# Patient Record
Sex: FEMALE | Race: BLACK OR AFRICAN AMERICAN | NOT HISPANIC OR LATINO | Employment: FULL TIME | URBAN - METROPOLITAN AREA
[De-identification: names, ages, dates, MRNs, and addresses within clinical notes are randomized per-mention and may not be internally consistent; named-entity substitution may affect disease eponyms.]

---

## 2017-07-28 LAB — POC BETA-HCG (QUAL): NEGATIVE

## 2019-07-06 LAB
BILIRUB SERPL-MCNC: NEGATIVE MG/DL
BLOOD URINE, POC: NORMAL
CLARITY, POC UA: CLEAR
COLOR, POC UA: YELLOW
GLUCOSE UR QL STRIP: NEGATIVE
KETONES UR QL STRIP: NEGATIVE
LEUKOCYTE EST, POC UA: NEGATIVE
NITRITE, POC UA: NEGATIVE
PH, POC UA: 6
PROTEIN, POC: NORMAL
SPECIFIC GRAVITY, POC UA: 1.02
UROBILINOGEN, POC UA: NORMAL

## 2021-01-26 ENCOUNTER — HISTORICAL (OUTPATIENT)
Dept: ADMINISTRATIVE | Facility: HOSPITAL | Age: 41
End: 2021-01-26

## 2021-01-26 LAB
ABS NEUT (OLG): 2.73 X10(3)/MCL (ref 2.1–9.2)
ALBUMIN SERPL-MCNC: 3.9 GM/DL (ref 3.5–5)
ALBUMIN/GLOB SERPL: 1.1 RATIO (ref 1.1–2)
ALP SERPL-CCNC: 44 UNIT/L (ref 40–150)
ALT SERPL-CCNC: 11 UNIT/L (ref 0–55)
AST SERPL-CCNC: 18 UNIT/L (ref 5–34)
BASOPHILS # BLD AUTO: 0 X10(3)/MCL (ref 0–0.2)
BASOPHILS NFR BLD AUTO: 1 %
BILIRUB SERPL-MCNC: 0.3 MG/DL
BILIRUBIN DIRECT+TOT PNL SERPL-MCNC: 0.1 MG/DL (ref 0–0.5)
BILIRUBIN DIRECT+TOT PNL SERPL-MCNC: 0.2 MG/DL (ref 0–0.8)
BUN SERPL-MCNC: 9 MG/DL (ref 7–18.7)
CALCIUM SERPL-MCNC: 8.6 MG/DL (ref 8.4–10.2)
CHLORIDE SERPL-SCNC: 107 MMOL/L (ref 98–107)
CHOLEST SERPL-MCNC: 154 MG/DL
CHOLEST/HDLC SERPL: 4 {RATIO} (ref 0–5)
CO2 SERPL-SCNC: 25 MMOL/L (ref 22–29)
CREAT SERPL-MCNC: 0.8 MG/DL (ref 0.55–1.02)
EOSINOPHIL # BLD AUTO: 0.1 X10(3)/MCL (ref 0–0.9)
EOSINOPHIL NFR BLD AUTO: 2 %
ERYTHROCYTE [DISTWIDTH] IN BLOOD BY AUTOMATED COUNT: 18.4 % (ref 11.5–14.5)
EST. AVERAGE GLUCOSE BLD GHB EST-MCNC: 105.4 MG/DL
GLOBULIN SER-MCNC: 3.7 GM/DL (ref 2.4–3.5)
GLUCOSE SERPL-MCNC: 88 MG/DL (ref 74–100)
HAV IGM SERPL QL IA: NONREACTIVE
HBA1C MFR BLD: 5.3 %
HBV CORE IGM SERPL QL IA: NONREACTIVE
HBV SURFACE AG SERPL QL IA: NONREACTIVE
HCT VFR BLD AUTO: 32.7 % (ref 35–46)
HCV AB SERPL QL IA: NONREACTIVE
HDLC SERPL-MCNC: 44 MG/DL (ref 35–60)
HGB BLD-MCNC: 9.7 GM/DL (ref 12–16)
HIV 1+2 AB+HIV1 P24 AG SERPL QL IA: NONREACTIVE
IMM GRANULOCYTES # BLD AUTO: 0.01 10*3/UL
IMM GRANULOCYTES NFR BLD AUTO: 0 %
LDLC SERPL CALC-MCNC: 102 MG/DL (ref 50–140)
LYMPHOCYTES # BLD AUTO: 1.2 X10(3)/MCL (ref 0.6–4.6)
LYMPHOCYTES NFR BLD AUTO: 26 %
MCH RBC QN AUTO: 22.9 PG (ref 26–34)
MCHC RBC AUTO-ENTMCNC: 29.7 GM/DL (ref 31–37)
MCV RBC AUTO: 77.3 FL (ref 80–100)
MONOCYTES # BLD AUTO: 0.4 X10(3)/MCL (ref 0.1–1.3)
MONOCYTES NFR BLD AUTO: 9 %
NEUTROPHILS # BLD AUTO: 2.73 X10(3)/MCL (ref 2.1–9.2)
NEUTROPHILS NFR BLD AUTO: 62 %
PLATELET # BLD AUTO: 264 X10(3)/MCL (ref 130–400)
PMV BLD AUTO: 10.7 FL (ref 7.4–10.4)
POTASSIUM SERPL-SCNC: 4 MMOL/L (ref 3.5–5.1)
PROT SERPL-MCNC: 7.6 GM/DL (ref 6.4–8.3)
RBC # BLD AUTO: 4.23 X10(6)/MCL (ref 4–5.2)
SODIUM SERPL-SCNC: 139 MMOL/L (ref 136–145)
T PALLIDUM AB SER QL: NONREACTIVE
TRIGL SERPL-MCNC: 39 MG/DL (ref 37–140)
TSH SERPL-ACNC: 0.77 UIU/ML (ref 0.35–4.94)
VLDLC SERPL CALC-MCNC: 8 MG/DL
WBC # SPEC AUTO: 4.4 X10(3)/MCL (ref 4.5–11)

## 2021-03-09 ENCOUNTER — HISTORICAL (OUTPATIENT)
Dept: RADIOLOGY | Facility: HOSPITAL | Age: 41
End: 2021-03-09

## 2021-03-23 ENCOUNTER — HISTORICAL (OUTPATIENT)
Dept: RADIOLOGY | Facility: HOSPITAL | Age: 41
End: 2021-03-23

## 2021-04-13 ENCOUNTER — HISTORICAL (OUTPATIENT)
Dept: RADIOLOGY | Facility: HOSPITAL | Age: 41
End: 2021-04-13

## 2021-04-20 LAB
PAP RECOMMENDATION EXT: NORMAL
PAP SMEAR: NORMAL

## 2021-04-27 ENCOUNTER — HISTORICAL (OUTPATIENT)
Dept: CARDIOLOGY | Facility: CLINIC | Age: 41
End: 2021-04-27

## 2021-04-27 LAB
BUN SERPL-MCNC: 12.9 MG/DL (ref 7–18.7)
CALCIUM SERPL-MCNC: 8.5 MG/DL (ref 8.4–10.2)
CHLORIDE SERPL-SCNC: 108 MMOL/L (ref 98–107)
CO2 SERPL-SCNC: 24 MMOL/L (ref 22–29)
CREAT SERPL-MCNC: 0.77 MG/DL (ref 0.55–1.02)
CREAT/UREA NIT SERPL: 17
GLUCOSE SERPL-MCNC: 87 MG/DL (ref 74–100)
HAV IGM SERPL QL IA: NONREACTIVE
HBV CORE IGM SERPL QL IA: NONREACTIVE
HBV SURFACE AG SERPL QL IA: NONREACTIVE
HCV AB SERPL QL IA: NONREACTIVE
HIV 1+2 AB+HIV1 P24 AG SERPL QL IA: NONREACTIVE
POTASSIUM SERPL-SCNC: 4.3 MMOL/L (ref 3.5–5.1)
SODIUM SERPL-SCNC: 139 MMOL/L (ref 136–145)
T PALLIDUM AB SER QL: NONREACTIVE

## 2021-05-25 ENCOUNTER — HISTORICAL (OUTPATIENT)
Dept: CARDIOLOGY | Facility: HOSPITAL | Age: 41
End: 2021-05-25

## 2021-06-29 ENCOUNTER — HISTORICAL (OUTPATIENT)
Dept: INTERNAL MEDICINE | Facility: CLINIC | Age: 41
End: 2021-06-29

## 2021-06-29 LAB
ERYTHROCYTE [DISTWIDTH] IN BLOOD BY AUTOMATED COUNT: 15.8 % (ref 11.5–14.5)
FERRITIN SERPL-MCNC: 7.02 NG/ML (ref 4.63–204)
HCT VFR BLD AUTO: 34.8 % (ref 35–46)
HGB BLD-MCNC: 10.5 GM/DL (ref 12–16)
IRON SATN MFR SERPL: 11 % (ref 20–50)
IRON SERPL-MCNC: 41 UG/DL (ref 50–170)
MCH RBC QN AUTO: 23.9 PG (ref 26–34)
MCHC RBC AUTO-ENTMCNC: 30.2 GM/DL (ref 31–37)
MCV RBC AUTO: 79.1 FL (ref 80–100)
NRBC BLD AUTO-RTO: 0 % (ref 0–0.2)
PLATELET # BLD AUTO: 237 X10(3)/MCL (ref 130–400)
PMV BLD AUTO: 10.7 FL (ref 7.4–10.4)
RBC # BLD AUTO: 4.4 X10(6)/MCL (ref 4–5.2)
TIBC SERPL-MCNC: 318 UG/DL (ref 70–310)
TIBC SERPL-MCNC: 359 UG/DL (ref 250–450)
TRANSFERRIN SERPL-MCNC: 318 MG/DL (ref 180–382)
WBC # SPEC AUTO: 4.7 X10(3)/MCL (ref 4.5–11)

## 2021-07-20 ENCOUNTER — HISTORICAL (OUTPATIENT)
Dept: ADMINISTRATIVE | Facility: HOSPITAL | Age: 41
End: 2021-07-20

## 2021-07-20 LAB
ABS NEUT (OLG): 3.17 X10(3)/MCL (ref 2.1–9.2)
APTT PPP: 32.1 SECOND(S) (ref 23.3–37)
BASOPHILS # BLD AUTO: 0 X10(3)/MCL (ref 0–0.2)
BASOPHILS NFR BLD AUTO: 1 %
BUN SERPL-MCNC: 11.9 MG/DL (ref 7–18.7)
CALCIUM SERPL-MCNC: 9.1 MG/DL (ref 8.4–10.2)
CHLORIDE SERPL-SCNC: 106 MMOL/L (ref 98–107)
CO2 SERPL-SCNC: 27 MMOL/L (ref 22–29)
CREAT SERPL-MCNC: 0.79 MG/DL (ref 0.55–1.02)
CREAT/UREA NIT SERPL: 15
EOSINOPHIL # BLD AUTO: 0.1 X10(3)/MCL (ref 0–0.9)
EOSINOPHIL NFR BLD AUTO: 2 %
ERYTHROCYTE [DISTWIDTH] IN BLOOD BY AUTOMATED COUNT: 17.3 % (ref 11.5–14.5)
GLUCOSE SERPL-MCNC: 83 MG/DL (ref 74–100)
HCT VFR BLD AUTO: 39.3 % (ref 35–46)
HGB BLD-MCNC: 11.7 GM/DL (ref 12–16)
IMM GRANULOCYTES # BLD AUTO: 0.01 10*3/UL
IMM GRANULOCYTES NFR BLD AUTO: 0 %
INR PPP: 1.09 (ref 0.9–1.2)
LYMPHOCYTES # BLD AUTO: 1.1 X10(3)/MCL (ref 0.6–4.6)
LYMPHOCYTES NFR BLD AUTO: 23 %
MCH RBC QN AUTO: 24.7 PG (ref 26–34)
MCHC RBC AUTO-ENTMCNC: 29.8 GM/DL (ref 31–37)
MCV RBC AUTO: 82.9 FL (ref 80–100)
MONOCYTES # BLD AUTO: 0.4 X10(3)/MCL (ref 0.1–1.3)
MONOCYTES NFR BLD AUTO: 9 %
NEUTROPHILS # BLD AUTO: 3.17 X10(3)/MCL (ref 2.1–9.2)
NEUTROPHILS NFR BLD AUTO: 65 %
NRBC BLD AUTO-RTO: 0 % (ref 0–0.2)
PLATELET # BLD AUTO: 249 X10(3)/MCL (ref 130–400)
PMV BLD AUTO: 11.5 FL (ref 7.4–10.4)
POTASSIUM SERPL-SCNC: 4.1 MMOL/L (ref 3.5–5.1)
PROTHROMBIN TIME: 13.9 SECOND(S) (ref 11.9–14.4)
RBC # BLD AUTO: 4.74 X10(6)/MCL (ref 4–5.2)
SODIUM SERPL-SCNC: 137 MMOL/L (ref 136–145)
WBC # SPEC AUTO: 4.9 X10(3)/MCL (ref 4.5–11)

## 2021-07-26 ENCOUNTER — HISTORICAL (OUTPATIENT)
Dept: CARDIOLOGY | Facility: HOSPITAL | Age: 41
End: 2021-07-26

## 2021-10-11 ENCOUNTER — HISTORICAL (OUTPATIENT)
Dept: RADIOLOGY | Facility: HOSPITAL | Age: 41
End: 2021-10-11

## 2022-04-10 ENCOUNTER — HISTORICAL (OUTPATIENT)
Dept: ADMINISTRATIVE | Facility: HOSPITAL | Age: 42
End: 2022-04-10
Payer: MEDICAID

## 2022-04-25 ENCOUNTER — HISTORICAL (OUTPATIENT)
Dept: FAMILY MEDICINE | Facility: CLINIC | Age: 42
End: 2022-04-25
Payer: MEDICAID

## 2022-04-25 LAB
ABS NEUT (OLG): 2.98 (ref 2.1–9.2)
ALBUMIN SERPL-MCNC: 3.6 G/DL (ref 3.5–5)
ALBUMIN/GLOB SERPL: 1 {RATIO} (ref 1.1–2)
ALP SERPL-CCNC: 44 U/L (ref 40–150)
ALT SERPL-CCNC: 8 U/L (ref 0–55)
AST SERPL-CCNC: 16 U/L (ref 5–34)
BASOPHILS # BLD AUTO: 0 10*3/UL (ref 0–0.2)
BASOPHILS NFR BLD AUTO: 0 %
BILIRUB SERPL-MCNC: 0.3 MG/DL
BILIRUBIN DIRECT+TOT PNL SERPL-MCNC: 0.1 (ref 0–0.8)
BILIRUBIN DIRECT+TOT PNL SERPL-MCNC: 0.2 (ref 0–0.5)
BUN SERPL-MCNC: 9.3 MG/DL (ref 7–18.7)
CALCIUM SERPL-MCNC: 8.6 MG/DL (ref 8.7–10.5)
CHLORIDE SERPL-SCNC: 109 MMOL/L (ref 98–107)
CHOLEST SERPL-MCNC: 164 MG/DL
CHOLEST/HDLC SERPL: 4 {RATIO} (ref 0–5)
CO2 SERPL-SCNC: 25 MMOL/L (ref 22–29)
CREAT SERPL-MCNC: 0.77 MG/DL (ref 0.55–1.02)
EOSINOPHIL # BLD AUTO: 0.2 10*3/UL (ref 0–0.9)
EOSINOPHIL NFR BLD AUTO: 4 %
ERYTHROCYTE [DISTWIDTH] IN BLOOD BY AUTOMATED COUNT: 13.2 % (ref 11.5–14.5)
EST. AVERAGE GLUCOSE BLD GHB EST-MCNC: 99.7 MG/DL
FERRITIN SERPL-MCNC: 62.98 NG/ML (ref 4.63–204)
FLAG2 (OHS): 60
FLAG3 (OHS): 80
FLAGS (OHS): 80
GLOBULIN SER-MCNC: 3.6 G/DL (ref 2.4–3.5)
GLUCOSE SERPL-MCNC: 99 MG/DL (ref 74–100)
HBA1C MFR BLD: 5.1 %
HCT VFR BLD AUTO: 39.8 % (ref 35–46)
HDLC SERPL-MCNC: 37 MG/DL (ref 35–60)
HEMOLYSIS INTERF INDEX SERPL-ACNC: 1
HGB BLD-MCNC: 12 G/DL (ref 12–16)
HIV 1+2 AB+HIV1 P24 AG SERPL QL IA: 0.06
HIV 1+2 AB+HIV1 P24 AG SERPL QL IA: NONREACTIVE
ICTERIC INTERF INDEX SERPL-ACNC: 0
IMM GRANULOCYTES # BLD AUTO: 0.01 10*3/UL
IMM GRANULOCYTES NFR BLD AUTO: 0 %
IMM. NE 2 SUSPECT FLAG (OHS): 20
LDLC SERPL CALC-MCNC: 113 MG/DL (ref 50–140)
LIPEMIC INTERF INDEX SERPL-ACNC: 7
LOW EVENT # SUSPECT FLAG (OHS): 80
LYMPHOCYTES # BLD AUTO: 0.8 10*3/UL (ref 0.6–4.6)
LYMPHOCYTES NFR BLD AUTO: 19 %
MANUAL DIFF? (OHS): NO
MCH RBC QN AUTO: 26.1 PG (ref 26–34)
MCHC RBC AUTO-ENTMCNC: 30.2 G/DL (ref 31–37)
MCV RBC AUTO: 86.7 FL (ref 80–100)
MO BLASTS SUSPECT FLAG (OHS): 40
MONOCYTES # BLD AUTO: 0.4 10*3/UL (ref 0.1–1.3)
MONOCYTES NFR BLD AUTO: 9 %
NEUTROPHILS # BLD AUTO: 2.98 10*3/UL (ref 2.1–9.2)
NEUTROPHILS NFR BLD AUTO: 67 %
NRBC BLD AUTO-RTO: 0 % (ref 0–0.2)
PLATELET # BLD AUTO: 224 10*3/UL (ref 130–400)
PLATELET CLUMPS SUSPECT FLAG (OHS): 10
PMV BLD AUTO: 10.8 FL (ref 7.4–10.4)
POTASSIUM SERPL-SCNC: 4.1 MMOL/L (ref 3.5–5.1)
PROT SERPL-MCNC: 7.2 G/DL (ref 6.4–8.3)
RBC # BLD AUTO: 4.59 10*6/UL (ref 4–5.2)
RET# (OHS): 0.07 (ref 0.02–0.08)
RETICULOCYTE COUNT AUTOMATED (OLG): 1.5 (ref 0.5–1.5)
SODIUM SERPL-SCNC: 138 MMOL/L (ref 136–145)
TRIGL SERPL-MCNC: 68 MG/DL (ref 37–140)
TSH SERPL-ACNC: 1.55 M[IU]/L (ref 0.35–4.94)
VLDLC SERPL CALC-MCNC: 14 MG/DL
WBC # SPEC AUTO: 4.4 10*3/UL (ref 4.5–11)
ZZGIANT PLATELETS (OHS): 20

## 2022-04-26 VITALS
DIASTOLIC BLOOD PRESSURE: 69 MMHG | SYSTOLIC BLOOD PRESSURE: 129 MMHG | WEIGHT: 155 LBS | HEIGHT: 62 IN | OXYGEN SATURATION: 100 % | BODY MASS INDEX: 28.52 KG/M2

## 2022-05-02 NOTE — HISTORICAL [facility] CERNER
This is a historical note converted from [employer]. Formatting and pictures may have been removed.  Please reference Favio for original formatting and attached multimedia. Chief Complaint  Establish Care  History of Present Illness  Osiris is a 41 yo AAF to establish PCP care. Pmhx includes HTN, palpitations, abnormal PAP. Today she is c/o left hip pain ongoing x few months. Intermittent.?Describes as achy pain. Denies any treatment. ROM exercises and rest?improves pain. Denies any radiculopathy, numbness, tingling, weakness. 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 Denies any pain at this time. Also reports an injury to right hand thumb approximately a year ago with rare report of pain to thumb. Does have achiness in hand. Denies any numbness/tingling/ swelling, limited ROM. BP today 136/84. HR 93. History of HTN and palpitations. She states she does endorse palpitations occasionally and has noticed it more?since not taking metoprolol. She was previously evaluated by cardiologist. Denies fever, chills, night sweats, HA, sore throat, sinus congestion, rhinorrhea,?blurred vision, tinnitus, dizziness, cough,?CP, SOB, abdominal pain, poor appetite, loss of taste/ smell, constipation, n/v/d,?hematochezia, dysuria, vaginal discharge, or hematuria. No other complaints at this time.?  ?   Social: Tobacco abuse_denies. ETOH_positive; 2 x per month.?Illicit drug use_denies.  Family History: Reviewed.  Occupation:?employedfull time?at [employer]  She?is single. She lives with her boyfriend and 10 yo son. Her other children are 19 yo and 22 yo.  ?  PCP: prior PCP about a year ago at [facility]  Dentist: Denies  Ophthalmologist: next visit with [facility]  Other providers:?  Review of Systems  Constitutional: negative except as stated in HPI  Eye: negative except as stated in HPI  ENMT: negative except as stated in HPI  Respiratory: negative except as stated in HPI  Cardiovascular: negative except as  stated in HPI  Gastrointestinal: negative except as stated in HPI  Genitourinary: negative except as stated in HPI  Hema/Lymph: negative except as stated in HPI  Endocrine: negative except as stated in HPI  Immunologic: negative except as stated in HPI  Musculoskeletal: negative except as stated in HPI  Integumentary: negative except as stated in HPI  Neurologic: negative except as stated in HPI  ?   All Other ROS_ ?negative except as stated in HPI  Physical Exam  Vitals & Measurements  T:?37.1? ?C (Oral)? HR:?93(Peripheral)? RR:?16? BP:?136/84?  HT:?160.02?cm? WT:?67.600?kg? BMI:?26.4? LMP:?01/22/2021 00:00 CST?  General: Alert and oriented. Appropriately dressed.?No acute distress.  Eye: Pupils are equal, round and reactive to light, Extraocular movements are intact. Wearing glasses.  HENT: Normocephalic. BTM intact without effusion, exudate or erythema. Wearing facial mask.  Neck: Supple, Non-tender.?No carotid bruit,?No lymphadenopathy. No thyromegaly, firmness or nodularity.  Respiratory: Lungs are clear to auscultation,?non diminished and without wheezes, Respirations are?non-labored, Breath sounds are?equal, Symmetrical chest wall expansion.  Cardiovascular: Normal rate,?Regular rhythm,?grade II/VI murmur. +2 bilateral pedal pulses?without BLE edema.  Gastrointestinal: Soft, Flat,?Non-tender,?Non-distended,?Normal bowel sounds x 4. No palpable masses or hernia.  Musculoskeletal: DEE. Steady gait?without assistance.?Left hip without tenderness or limitations in ROM on exam. Abduction/ adduction/ lateral rotation/ flexion and extension intact. Bilateral hands without deformity.  Integumentary: Warm, Dry, Intact.  Neurologic: No focal deficits, Cranial Nerves II-XII are grossly intact.  Psychiatric: Calm. Appropriate mood and affect. Judgment intact with clear thought processes. Denies SI/HI.  Assessment/Plan  1.?Left hip pain?M25.552  c/o left hip pain ongoing x few months  describes as achy pain, denies any  tx; stretching?and rest relieves pain  XR left hip today  Ordered:  CBC w/ Auto Diff, Routine collect, *Est. 01/26/21 3:00:00 CST, Blood, Order for future visit, *Est. Stop date 01/26/21 3:00:00 CST, Lab Collect, Left hip pain  Hypertension  Well adult exam, 01/26/21 9:47:00 CST  Clinic Follow up, *Est. 03/26/21 3:00:00 CDT, Order for future visit, Left hip pain  Hypertension  Palpitations  Heart murmur  History of abnormal cervical Pap smear  Well adult exam, [facility]  Comprehensive Metabolic Panel, Routine collect, *Est. 01/26/21 3:00:00 CST, Blood, Order for future visit, *Est. Stop date 01/26/21 3:00:00 CST, Lab Collect, Left hip pain  Hypertension  Well adult exam, 01/26/21 9:47:00 CST  Hemoglobin A1C [facility] [place], Routine collect, *Est. 01/26/21 3:00:00 CST, Blood, Order for future visit, *Est. Stop date 01/26/21 3:00:00 CST, Lab Collect, Left hip pain  Hypertension  Well adult exam, 01/26/21 9:47:00 CST  Hepatitis Panel [facility], Routine collect, *Est. 01/26/21 3:00:00 CST, Blood, Order for future visit, *Est. Stop date 01/26/21 3:00:00 CST, Lab Collect, Left hip pain  Hypertension  Well adult exam, 01/26/21 9:48:00 CST  HIV 1 and 2, Routine collect, *Est. 01/26/21 3:00:00 CST, Blood, Order for future visit, *Est. Stop date 01/26/21 3:00:00 CST, Lab Collect, Left hip pain  Hypertension  Well adult exam, 01/26/21 9:48:00 CST  Lipid Panel, Routine collect, *Est. 01/26/21 3:00:00 CST, Blood, Order for future visit, *Est. Stop date 01/26/21 3:00:00 CST, Lab Collect, Left hip pain  Hypertension  Well adult exam, 01/26/21 9:47:00 CST  Syphilis Antibody (with Reflex RPR), Routine collect, *Est. 01/26/21 3:00:00 CST, Blood, Order for future visit, *Est. Stop date 01/26/21 3:00:00 CST, Lab Collect, Left hip pain  Hypertension  Well adult exam, 01/26/21 9:48:00 CST  Thyroid Stimulating Hormone, Routine collect, *Est. 01/26/21 3:00:00 CST, Blood, Order for future visit, *Est. Stop date 01/26/21 3:00:00  CST, Lab Collect, Left hip pain  Hypertension  Well adult exam, 01/26/21 9:48:00 CST  XR Hip Left 2 Views, Routine, *Est. 01/26/21 3:00:00 CST, left hip pain, None, Ambulatory, Rad Type, Order for future visit, Left hip pain, Not Scheduled, *Est. 01/26/21 3:00:00 CST  ?  2.?Hypertension?I10  /84  Educated on low sodium diet  Educated to avoid alcohol or tobacco use if applicable  Educated on health benefits of?at least 5 days/ week?of 30 minutes moderate intensity exercise (brisk walking) and 2 or more days/ week of muscle strength activities  Resume metoprolol 25 mg once daily  Ordered:  metoprolol, 25 mg = 1 tab(s), Oral, At Bedtime, # 90 tab(s), 0 Refill(s), Pharmacy: [id number], 160.02, cm, Height/Length Dosing, 01/26/21 9:17:00 CST, 67.6, kg, Weight Dosing, 01/26/21 9:17:00 CST  CBC w/ Auto Diff, Routine collect, *Est. 01/26/21 3:00:00 CST, Blood, Order for future visit, *Est. Stop date 01/26/21 3:00:00 CST, Lab Collect, Left hip pain  Hypertension  Well adult exam, 01/26/21 9:47:00 CST  Clinic Follow up, *Est. 03/26/21 3:00:00 CDT, Order for future visit, Left hip pain  Hypertension  Palpitations  Heart murmur  History of abnormal cervical Pap smear  Well adult exam, [facility]  Comprehensive Metabolic Panel, Routine collect, *Est. 01/26/21 3:00:00 CST, Blood, Order for future visit, *Est. Stop date 01/26/21 3:00:00 CST, Lab Collect, Left hip pain  Hypertension  Well adult exam, 01/26/21 9:47:00 CST  Hemoglobin A1C [facility], Routine collect, *Est. 01/26/21 3:00:00 CST, Blood, Order for future visit, *Est. Stop date 01/26/21 3:00:00 CST, Lab Collect, Left hip pain  Hypertension  Well adult exam, 01/26/21 9:47:00 CST  Hepatitis Panel [facility], Routine collect, *Est. 01/26/21 3:00:00 CST, Blood, Order for future visit, *Est. Stop date 01/26/21 3:00:00 CST, Lab Collect, Left hip pain  Hypertension  Well adult exam, 01/26/21 9:48:00 CST  HIV 1 and 2, Routine collect, *Est. 01/26/21  3:00:00 CST, Blood, Order for future visit, *Est. Stop date 01/26/21 3:00:00 CST, Lab Collect, Left hip pain  Hypertension  Well adult exam, 01/26/21 9:48:00 CST  Lipid Panel, Routine collect, *Est. 01/26/21 3:00:00 CST, Blood, Order for future visit, *Est. Stop date 01/26/21 3:00:00 CST, Lab Collect, Left hip pain  Hypertension  Well adult exam, 01/26/21 9:47:00 CST  Syphilis Antibody (with Reflex RPR), Routine collect, *Est. 01/26/21 3:00:00 CST, Blood, Order for future visit, *Est. Stop date 01/26/21 3:00:00 CST, Lab Collect, Left hip pain  Hypertension  Well adult exam, 01/26/21 9:48:00 CST  Thyroid Stimulating Hormone, Routine collect, *Est. 01/26/21 3:00:00 CST, Blood, Order for future visit, *Est. Stop date 01/26/21 3:00:00 CST, Lab Collect, Left hip pain  Hypertension  Well adult exam, 01/26/21 9:48:00 CST  ?  3.?Palpitations?R00.2  previously on metoprolol; out of medication x months  Echo, Holter ordered, referral to cardiology  Resume metoprolol 25 mg once daily  Ordered:  metoprolol, 25 mg = 1 tab(s), Oral, At Bedtime, # 90 tab(s), 0 Refill(s), Pharmacy: [id number], 160.02, cm, Height/Length Dosing, 01/26/21 9:17:00 CST, 67.6, kg, Weight Dosing, 01/26/21 9:17:00 CST  Clinic Follow up, *Est. 03/26/21 3:00:00 CDT, Order for future visit, Left hip pain  Hypertension  Palpitations  Heart murmur  History of abnormal cervical Pap smear  Well adult exam, [facility]  Echocardiogram Complete Adult, *Est. 01/26/21 3:00:00 CST, Routine, palpitations, *Est. Stop date 01/26/21 3:00:00 CST, Ambulatory, [facility], Order for future visit, Palpitations  Heart murmur  Holter Monitor 24/48 Hour Application/Removal, *Est. 01/26/21 3:00:00 CST, *Est. Stop date 01/26/21 3:00:00 CST, Order for future visit, Palpitations  Heart murmur, [facility]  ?  4.?Heart murmur?R01.1  denies diagnosis of prior heart murmur  complete echo ordered, see #  3  Ordered:  Clinic Follow up, *Est. 03/26/21 3:00:00 CDT, Order for future visit, Left hip pain  Hypertension  Palpitations  Heart murmur  History of abnormal cervical Pap smear  Well adult exam, [facility]  Echocardiogram Complete Adult, *Est. 01/26/21 3:00:00 CST, Routine, palpitations, *Est. Stop date 01/26/21 3:00:00 CST, Ambulatory, [facility], Order for future visit, Palpitations  Heart murmur  Holter Monitor 24/48 Hour Application/Removal, *Est. 01/26/21 3:00:00 CST, *Est. Stop date 01/26/21 3:00:00 CST, Order for future visit, Palpitations  Heart murmur, [facility]  ?  5.?History of abnormal cervical Pap smear?Z87.42  reports h/o abnormal PAP and colposcopy; chart review noted attempt made in 2019 to schedule?for colposcopy that was unsuccessful  sign TERRIE to request prior PAP records from [facility]  Ordered:  Clinic Follow up, *Est. 03/26/21 3:00:00 CDT, Order for future visit, Left hip pain  Hypertension  Palpitations  Heart murmur  History of abnormal cervical Pap smear  Well adult exam, [facility]  ?  6.?Well adult exam?Z00.00  Health Maintenance:  MMG- ordered  GYN-?PAP?last year at [facility], sign TERRIE to obtain copy of records; referral to [facility]  DEXA-  CRC-  ?  Family History ?negative.  ?  Vaccines:  Influenza-  Pneumonia-  Tdap-  Ordered:  CBC w/ Auto Diff, Routine collect, *Est. 01/26/21 3:00:00 CST, Blood, Order for future visit, *Est. Stop date 01/26/21 3:00:00 CST, Lab Collect, Left hip pain  Hypertension  Well adult exam, 01/26/21 9:47:00 CST  Clinic Follow up, *Est. 03/26/21 3:00:00 CDT, Order for future visit, Left hip pain  Hypertension  Palpitations  Heart murmur  History of abnormal cervical Pap smear  Well adult exam, [facility]  Comprehensive Metabolic Panel, Routine collect, *Est. 01/26/21 3:00:00 CST, Blood, Order for future visit, *Est. Stop date 01/26/21 3:00:00 CST, Lab Collect, Left hip pain  Hypertension  Well adult  exam, 01/26/21 9:47:00 CST  Hemoglobin A1C [facility], Routine collect, *Est. 01/26/21 3:00:00 CST, Blood, Order for future visit, *Est. Stop date 01/26/21 3:00:00 CST, Lab Collect, Left hip pain  Hypertension  Well adult exam, 01/26/21 9:47:00 CST  Hepatitis Panel [facility], Routine collect, *Est. 01/26/21 3:00:00 CST, Blood, Order for future visit, *Est. Stop date 01/26/21 3:00:00 CST, Lab Collect, Left hip pain  Hypertension  Well adult exam, 01/26/21 9:48:00 CST  HIV 1 and 2, Routine collect, *Est. 01/26/21 3:00:00 CST, Blood, Order for future visit, *Est. Stop date 01/26/21 3:00:00 CST, Lab Collect, Left hip pain  Hypertension  Well adult exam, 01/26/21 9:48:00 CST  Lipid Panel, Routine collect, *Est. 01/26/21 3:00:00 CST, Blood, Order for future visit, *Est. Stop date 01/26/21 3:00:00 CST, Lab Collect, Left hip pain  Hypertension  Well adult exam, 01/26/21 9:47:00 CST  Syphilis Antibody (with Reflex RPR), Routine collect, *Est. 01/26/21 3:00:00 CST, Blood, Order for future visit, *Est. Stop date 01/26/21 3:00:00 CST, Lab Collect, Left hip pain  Hypertension  Well adult exam, 01/26/21 9:48:00 CST  Thyroid Stimulating Hormone, Routine collect, *Est. 01/26/21 3:00:00 CST, Blood, Order for future visit, *Est. Stop date 01/26/21 3:00:00 CST, Lab Collect, Left hip pain  Hypertension  Well adult exam, 01/26/21 9:48:00 CST  ?  Orders:  MG Screening Bilateral, Routine, *Est. 01/26/21 3:00:00 CST, Screening, None, Ambulatory, Rad Type, Order for future visit, Breast cancer screening, Schedule this test, [facility], Follow Breast Imaging Order Set Protocol, *Est. 01/26/21 3:00:00 CST  Labs and XR today.  RTC in 2 mo follow up.  If at any time condition worsens or experience new symptoms/ concerns, please call clinic for sooner appointment or go to ED/UCC.  Referrals  [facility] Internal Referral to Cardiology Clinic, Specialty: Cardiology, Reason: Palpitations, heart murmur, Refer To: Provider Not  Specified, [facility], [address] [place] [id number]., Start: 21 10:02:00 CST  [facility] Internal Referral to Gynecology Clinic, Specialty: Gynecology, Reason: PAP/ history of abnormal PAP and colposcopy, Refer To: Provider Not Specified, [facility] , [address] [place] [address]., Start: 21 9:48:00 CST  Clinic Follow up, *Est. 21 3:00:00 CDT, Order for future visit, Left hip pain  Hypertension  Palpitations  Heart murmur  History of abnormal cervical Pap smear  Well adult exam, [facility]   Problem List/Past Medical History  Ongoing  History of abnormal cervical Pap smear  Hypertension  Palpitations  Historical  No qualifying data  Procedure/Surgical History   delivery ()   delivery ()   delivery ()   Medications  Metoprolol Tartrate 25 mg oral tablet, 25 mg= 1 tab(s), Oral, At Bedtime  Vitamin C OTC, See Instructions  Allergies  No Known Allergies  Social History  Abuse/Neglect  No, 2021  Alcohol  Current, Liquor, 1-2 times per month, 2015  Employment/School  Employed, Highest education level: Some college. Operates hazardous equipment: No., 2015  Exercise  Home/Environment  Lives with Children. Living situation: Home/Independent. Alcohol abuse in household: No. Substance abuse in household: No. Smoker in household: No. Injuries/Abuse/Neglect in household: No. Feels unsafe at home: No. Safe place to go: Yes. Agency(s)/Others notified: No. Family/Friends available for support: Yes. Concern for family members at home: No. Major illness in household: No. Financial concerns: Yes., 2015  Nutrition/Health  Regular, Caffeine intake amount: 2-3 SODAS DAILY. Wants to lose weight: No. Sleeping concerns: No. Feels highly stressed: Yes., 2015  Other  Sexual  Gender Identity Identifies as female., 2021  Spiritual/Cultural  Non denomination, 2021  Substance Use - Denies Substance  Abuse, 07/31/2015  Tobacco - Denies Tobacco Use, 07/31/2015  Never (less than 100 in lifetime), N/A, 01/26/2021  Family History  Acute myocardial infarction.: Father.  Diabetes: Mother and Father.  Diabetes mellitus type 2: Sister.  Hypertension.: Mother, Father and Sister.  Immunizations  Vaccine Date Status   tetanus/diphtheria/pertussis, acel(Tdap) 02/27/2020 Given   hepatitis B adult vaccine 07/09/2013 Recorded   tetanus/diphtheria/pertussis, acel(Tdap) 11/16/2007 Recorded   influenza virus vaccine, inactivated 11/16/2007 Recorded   measles/mumps/rubella virus vaccine 08/05/1982 Recorded   Health Maintenance  Health Maintenance  ???Pending?(in the next year)  ??? ??OverDue  ??? ? ? ?Hypertension Management-BMP due??07/23/15??and every 1??year(s)  ??? ? ? ?Diabetes Screening due??07/22/17??and every 3??year(s)  ??? ? ? ?Lipid Screening due??07/22/19??and every 5??year(s)  ??? ??Due In Future?  ??? ? ? ?Obesity Screening not due until??01/01/22??and every 1??year(s)  ??? ? ? ?Alcohol Misuse Screening not due until??01/02/22??and every 1??year(s)  ???Satisfied?(in the past 1 year)  ??? ??Satisfied?  ??? ? ? ?ADL Screening on??01/26/21.??Satisfied by Diamante Soto LPN  ??? ? ? ?Alcohol Misuse Screening on??01/26/21.??Satisfied by Zeynep Ayala  ??? ? ? ?Blood Pressure Screening on??01/26/21.??Satisfied by Diamante Soto LPN  ??? ? ? ?Body Mass Index Check on??01/26/21.??Satisfied by Diamante Soto LPN  ??? ? ? ?Depression Screening on??01/26/21.??Satisfied by Diamante Soto LPN  ??? ? ? ?Hypertension Management-Blood Pressure on??01/26/21.??Satisfied by Diamante Soto LPN  ??? ? ? ?Influenza Vaccine on??01/26/21.??Satisfied by Diamante Soto LPN  ??? ? ? ?Obesity Screening on??01/26/21.??Satisfied by Diamante Soto LPN  ??? ? ? ?Tetanus Vaccine on??02/27/20.??Satisfied by Mukul BAUM, Yovanny NUNN  ??? ??Refused?  ??? ? ? ?Influenza Vaccine on??01/26/21.??Recorded by Zeynep Ayala??Reason:  Patient Refuses  ?

## 2022-05-30 ENCOUNTER — HOSPITAL ENCOUNTER (OUTPATIENT)
Dept: RADIOLOGY | Facility: HOSPITAL | Age: 42
Discharge: HOME | End: 2022-05-30
Attending: NURSE PRACTITIONER
Payer: MEDICAID

## 2022-05-30 DIAGNOSIS — N64.4 MASTODYNIA: ICD-10-CM

## 2022-05-30 PROCEDURE — 76642 ULTRASOUND BREAST LIMITED: CPT | Mod: 26,LT,, | Performed by: RADIOLOGY

## 2022-05-30 PROCEDURE — 76642 US BREAST LEFT LIMITED: ICD-10-PCS | Mod: 26,LT,, | Performed by: RADIOLOGY

## 2022-05-30 PROCEDURE — 77062 MAMMO DIGITAL DIAGNOSTIC BILAT WITH TOMO: ICD-10-PCS | Mod: 26,,, | Performed by: RADIOLOGY

## 2022-05-30 PROCEDURE — 77066 DX MAMMO INCL CAD BI: CPT | Mod: 26,,, | Performed by: RADIOLOGY

## 2022-05-30 PROCEDURE — 77066 DX MAMMO INCL CAD BI: CPT | Mod: TC

## 2022-05-30 PROCEDURE — 76642 ULTRASOUND BREAST LIMITED: CPT | Mod: TC,LT

## 2022-05-30 PROCEDURE — 77062 BREAST TOMOSYNTHESIS BI: CPT | Mod: 26,,, | Performed by: RADIOLOGY

## 2022-05-30 PROCEDURE — 77066 MAMMO DIGITAL DIAGNOSTIC BILAT WITH TOMO: ICD-10-PCS | Mod: 26,,, | Performed by: RADIOLOGY

## 2022-05-31 ENCOUNTER — PATIENT MESSAGE (OUTPATIENT)
Dept: INTERNAL MEDICINE | Facility: CLINIC | Age: 42
End: 2022-05-31
Payer: MEDICAID

## 2022-05-31 NOTE — TELEPHONE ENCOUNTER
"Patient of Zeynep Smith NP. Please inform of benign diagnostic breast exams. F/u with Zeynep with concerns.    "IMPRESSION: BENIGN  Right Breast: Negative (BI-RADS 1)  Left Breast: Benign (BI-RADS 2)     Recommendations:  There is no mammographic or sonographic correlate to the reported symptom of pain in the left axillary tail at the 2:00 position, 15 cm from the nipple, and in the left breast at the 3:00 position, 14 cm from the nipple. Therefore, management of this symptom should be based on findings at clinical breast examination.      Recommend continued annual screening mammography (with Digital Breast Tomosynthesis) due in 05/2023, according to American College of Rad[employer] guidelines.     Findings and recommendations were discussed by Dr. MELANIA Varghese with the patient."  "

## 2022-06-13 ENCOUNTER — OFFICE VISIT (OUTPATIENT)
Dept: GYNECOLOGY | Facility: CLINIC | Age: 42
End: 2022-06-13
Payer: MEDICAID

## 2022-06-13 VITALS
DIASTOLIC BLOOD PRESSURE: 82 MMHG | RESPIRATION RATE: 18 BRPM | HEART RATE: 76 BPM | OXYGEN SATURATION: 100 % | BODY MASS INDEX: 31.77 KG/M2 | HEIGHT: 62 IN | SYSTOLIC BLOOD PRESSURE: 136 MMHG | TEMPERATURE: 98 F | WEIGHT: 172.63 LBS

## 2022-06-13 DIAGNOSIS — Z01.419 ENCOUNTER FOR ANNUAL ROUTINE GYNECOLOGICAL EXAMINATION: Primary | ICD-10-CM

## 2022-06-13 PROCEDURE — 1160F PR REVIEW ALL MEDS BY PRESCRIBER/CLIN PHARMACIST DOCUMENTED: ICD-10-PCS | Mod: CPTII,,, | Performed by: NURSE PRACTITIONER

## 2022-06-13 PROCEDURE — 1159F MED LIST DOCD IN RCRD: CPT | Mod: CPTII,,, | Performed by: NURSE PRACTITIONER

## 2022-06-13 PROCEDURE — 1160F RVW MEDS BY RX/DR IN RCRD: CPT | Mod: CPTII,,, | Performed by: NURSE PRACTITIONER

## 2022-06-13 PROCEDURE — 3075F PR MOST RECENT SYSTOLIC BLOOD PRESS GE 130-139MM HG: ICD-10-PCS | Mod: CPTII,,, | Performed by: NURSE PRACTITIONER

## 2022-06-13 PROCEDURE — 3008F BODY MASS INDEX DOCD: CPT | Mod: CPTII,,, | Performed by: NURSE PRACTITIONER

## 2022-06-13 PROCEDURE — 99396 PREV VISIT EST AGE 40-64: CPT | Mod: S$PBB,,, | Performed by: NURSE PRACTITIONER

## 2022-06-13 PROCEDURE — 99213 OFFICE O/P EST LOW 20 MIN: CPT | Mod: PBBFAC | Performed by: NURSE PRACTITIONER

## 2022-06-13 PROCEDURE — 3079F DIAST BP 80-89 MM HG: CPT | Mod: CPTII,,, | Performed by: NURSE PRACTITIONER

## 2022-06-13 PROCEDURE — 99396 PR PREVENTIVE VISIT,EST,40-64: ICD-10-PCS | Mod: S$PBB,,, | Performed by: NURSE PRACTITIONER

## 2022-06-13 PROCEDURE — 3079F PR MOST RECENT DIASTOLIC BLOOD PRESSURE 80-89 MM HG: ICD-10-PCS | Mod: CPTII,,, | Performed by: NURSE PRACTITIONER

## 2022-06-13 PROCEDURE — 1159F PR MEDICATION LIST DOCUMENTED IN MEDICAL RECORD: ICD-10-PCS | Mod: CPTII,,, | Performed by: NURSE PRACTITIONER

## 2022-06-13 PROCEDURE — 3008F PR BODY MASS INDEX (BMI) DOCUMENTED: ICD-10-PCS | Mod: CPTII,,, | Performed by: NURSE PRACTITIONER

## 2022-06-13 PROCEDURE — 4010F ACE/ARB THERAPY RXD/TAKEN: CPT | Mod: CPTII,,, | Performed by: NURSE PRACTITIONER

## 2022-06-13 PROCEDURE — 4010F PR ACE/ARB THEARPY RXD/TAKEN: ICD-10-PCS | Mod: CPTII,,, | Performed by: NURSE PRACTITIONER

## 2022-06-13 PROCEDURE — 3075F SYST BP GE 130 - 139MM HG: CPT | Mod: CPTII,,, | Performed by: NURSE PRACTITIONER

## 2022-06-13 RX ORDER — FERROUS SULFATE TAB 325 MG (65 MG ELEMENTAL FE) 325 (65 FE) MG
TAB ORAL DAILY PRN
COMMUNITY
Start: 2022-06-09 | End: 2022-08-11

## 2022-06-13 RX ORDER — SACUBITRIL AND VALSARTAN 24; 26 MG/1; MG/1
TABLET, FILM COATED ORAL
COMMUNITY
Start: 2021-12-27 | End: 2022-08-01 | Stop reason: SDUPTHER

## 2022-06-13 RX ORDER — LORATADINE 10 MG/1
10 TABLET ORAL DAILY
COMMUNITY
Start: 2022-04-25 | End: 2023-04-24 | Stop reason: SDUPTHER

## 2022-06-13 RX ORDER — METOPROLOL SUCCINATE 50 MG/1
50 TABLET, EXTENDED RELEASE ORAL DAILY
COMMUNITY
Start: 2022-06-09 | End: 2022-08-01 | Stop reason: SDUPTHER

## 2022-06-13 NOTE — PROGRESS NOTES
"  Subjective:       Patient ID: Osiris Guillen is a 41 y.o. female.    Chief Complaint:  Well Woman    History of Present Illness  The patient  here for annual exam. Her LMP was 22. Period last 5 days and changes pads every 2 hours on 2 heavy days.Hx of uterine fibroid. Admits history of abnormal pap with colpo in . Pap was ASCUS and HPV positive, ECC benign. Pap in -NIL and HPV neg. MG-22-BIRADS 2. Pt had Lt breast bx which showed fibroadenamatoid changes, she has referral to breast clinic. N/s for breast clinic in 2022. Admits left breast pain with cycles and admits caffeine use and wired bras which she has been directed to change. Denies urinary complaints. Denies pelvic pain or discharge. Pt reports chlamydia in the past and declines testing. HPV vaccine completed. Contraception consist of TL. Denies tobacco use. Denies fly hx of breast, ovarian, uterine or colon cancer.     Review of Systems   Negative except for HPI.    GYN & OB History  Patient's last menstrual period was 2022.   Date of Last Pap: -NIL and HPV neg.    Review of patient's allergies indicates:  No Known Allergies     Past Medical History:   Diagnosis Date    Abnormal Pap smear of cervix     Anemia     Hypertension      OB History    Para Term  AB Living   4 3           SAB IAB Ectopic Multiple Live Births                  # Outcome Date GA Lbr Stas/2nd Weight Sex Delivery Anes PTL Lv   4             3 Para            2 Para            1 Para                 Review of Systems  Negative except for pertinent findings for positives per HPI     Objective:    Physical Exam    /82 (BP Location: Right arm, Patient Position: Sitting, BP Method: Medium (Automatic))   Pulse 76   Temp 98.4 °F (36.9 °C)   Resp 18   Ht 5' 2" (1.575 m)   Wt 78.3 kg (172 lb 9.6 oz)   LMP 2022   SpO2 100%   BMI 31.57 kg/m²   GENERAL: Well-developed female in no acute distress.  SKIN: Normal to " inspection, warm and intact.  BREASTS: No masses, lumps, discharge, tenderness.  ABDOMEN: Soft, non tender.  VULVA: General appearance normal; external genitalia with no lesions or erythema.  BLADDER: No tenderness.  VAGINA: Mucosa normal, pink, no discharge or lesions.  CERVIX: Grossly normal, pink, no erythema or discharge.  BIMANUAL EXAM: reveals a 10 week-sized uterus. The uterus is non tender. Noah adnexa reveal no evidence of masses, tenderness.  PSYCHIATRIC: Patient is oriented to person, place, and time. Mood and affect are normal.    Assessment:       1. Encounter for annual routine gynecological examination       Plan:   Osiris was seen today for well woman.    Diagnoses and all orders for this visit:    Encounter for annual routine gynecological examination    Pelvic exam, pap utd per ACOG.  Reschedule breast clinic appt as needed.  Follow up in about 1 year (around 6/13/2023) for annual exam.

## 2022-08-01 ENCOUNTER — OFFICE VISIT (OUTPATIENT)
Dept: CARDIOLOGY | Facility: CLINIC | Age: 42
End: 2022-08-01
Payer: MEDICAID

## 2022-08-01 VITALS
WEIGHT: 169 LBS | DIASTOLIC BLOOD PRESSURE: 72 MMHG | HEIGHT: 62 IN | SYSTOLIC BLOOD PRESSURE: 138 MMHG | BODY MASS INDEX: 31.1 KG/M2 | TEMPERATURE: 98 F | HEART RATE: 89 BPM | RESPIRATION RATE: 18 BRPM | OXYGEN SATURATION: 100 %

## 2022-08-01 DIAGNOSIS — I50.20 HEART FAILURE WITH REDUCED EJECTION FRACTION: ICD-10-CM

## 2022-08-01 DIAGNOSIS — I10 PRIMARY HYPERTENSION: Primary | ICD-10-CM

## 2022-08-01 DIAGNOSIS — R00.2 PALPITATIONS: ICD-10-CM

## 2022-08-01 PROCEDURE — 3008F PR BODY MASS INDEX (BMI) DOCUMENTED: ICD-10-PCS | Mod: CPTII,,, | Performed by: NURSE PRACTITIONER

## 2022-08-01 PROCEDURE — 99214 OFFICE O/P EST MOD 30 MIN: CPT | Mod: PBBFAC | Performed by: NURSE PRACTITIONER

## 2022-08-01 PROCEDURE — 3008F BODY MASS INDEX DOCD: CPT | Mod: CPTII,,, | Performed by: NURSE PRACTITIONER

## 2022-08-01 PROCEDURE — 1159F PR MEDICATION LIST DOCUMENTED IN MEDICAL RECORD: ICD-10-PCS | Mod: CPTII,,, | Performed by: NURSE PRACTITIONER

## 2022-08-01 PROCEDURE — 93005 ELECTROCARDIOGRAM TRACING: CPT

## 2022-08-01 PROCEDURE — 4010F PR ACE/ARB THEARPY RXD/TAKEN: ICD-10-PCS | Mod: CPTII,,, | Performed by: NURSE PRACTITIONER

## 2022-08-01 PROCEDURE — 1160F RVW MEDS BY RX/DR IN RCRD: CPT | Mod: CPTII,,, | Performed by: NURSE PRACTITIONER

## 2022-08-01 PROCEDURE — 3078F DIAST BP <80 MM HG: CPT | Mod: CPTII,,, | Performed by: NURSE PRACTITIONER

## 2022-08-01 PROCEDURE — 4010F ACE/ARB THERAPY RXD/TAKEN: CPT | Mod: CPTII,,, | Performed by: NURSE PRACTITIONER

## 2022-08-01 PROCEDURE — 3075F SYST BP GE 130 - 139MM HG: CPT | Mod: CPTII,,, | Performed by: NURSE PRACTITIONER

## 2022-08-01 PROCEDURE — 99213 PR OFFICE/OUTPT VISIT, EST, LEVL III, 20-29 MIN: ICD-10-PCS | Mod: 25,S$PBB,, | Performed by: NURSE PRACTITIONER

## 2022-08-01 PROCEDURE — 1159F MED LIST DOCD IN RCRD: CPT | Mod: CPTII,,, | Performed by: NURSE PRACTITIONER

## 2022-08-01 PROCEDURE — 1160F PR REVIEW ALL MEDS BY PRESCRIBER/CLIN PHARMACIST DOCUMENTED: ICD-10-PCS | Mod: CPTII,,, | Performed by: NURSE PRACTITIONER

## 2022-08-01 PROCEDURE — 99213 OFFICE O/P EST LOW 20 MIN: CPT | Mod: 25,S$PBB,, | Performed by: NURSE PRACTITIONER

## 2022-08-01 PROCEDURE — 3078F PR MOST RECENT DIASTOLIC BLOOD PRESSURE < 80 MM HG: ICD-10-PCS | Mod: CPTII,,, | Performed by: NURSE PRACTITIONER

## 2022-08-01 PROCEDURE — 3075F PR MOST RECENT SYSTOLIC BLOOD PRESS GE 130-139MM HG: ICD-10-PCS | Mod: CPTII,,, | Performed by: NURSE PRACTITIONER

## 2022-08-01 RX ORDER — SACUBITRIL AND VALSARTAN 24; 26 MG/1; MG/1
1 TABLET, FILM COATED ORAL 2 TIMES DAILY
Qty: 60 TABLET | Refills: 11 | Status: SHIPPED | OUTPATIENT
Start: 2022-08-01 | End: 2023-06-19 | Stop reason: SDUPTHER

## 2022-08-01 RX ORDER — METOPROLOL SUCCINATE 50 MG/1
50 TABLET, EXTENDED RELEASE ORAL DAILY
Qty: 30 TABLET | Refills: 11 | Status: SHIPPED | OUTPATIENT
Start: 2022-08-01 | End: 2023-06-19 | Stop reason: SDUPTHER

## 2022-08-01 NOTE — PROGRESS NOTES
CHIEF COMPLAINT:   Chief Complaint   Patient presents with    Follow-up     6 mos f/u w/labs pt denies targets                   Review of patient's allergies indicates:  No Known Allergies                                       HPI:  Osiris Guillen 41 y.o. female with a past medical history of HTN and palpitations presents for 6 month follow up and ongoing care.  Patient completed an Echocardiogram on October 11, 2021 which revealed an ejection fraction of approximately 50 to 55%, trace MR, and mild TR. She completed an Echocardiogram on 3.9.21 that revealed an ejection fraction that measured approximately 40% which is reduced from previous EF of 55% per echo in 2016. (see full report below). A 48-hour Holter monitor done in March 2021 revealed that the patient was in sinus rhythm with episodes of sinus tachycardia with an average heart rate of 81 bpm. Heart rates greater than 120 bpm were noted 2% of the time. There were no episodes of bradycardia noted. She also noted to have occasional PVCs and PACs. She completed a Lexiscan stress test on 5.25.21 that revealed possible anterior and apical ischemia. Patient subsequently underwent coronary angiogram procedure on July 26, 2021 which revealed no coronary stenosis.    Patient states she has been feeling well and has no complaints of chest pain or shortness of breath.  She reports only rare episodes of palpitations that she states do not affect her ADLs.  She also denies orthopnea, PND, peripheral edema, or syncope.  She reports compliance with her medications.                                                                                           Patient Active Problem List   Diagnosis    Heart failure with reduced ejection fraction    Primary hypertension    Palpitations     Past Surgical History:   Procedure Laterality Date    TUBAL LIGATION       Social History     Socioeconomic History    Marital status: Single   Tobacco Use    Smoking status: Never  "Smoker    Smokeless tobacco: Never Used   Substance and Sexual Activity    Alcohol use: Yes     Alcohol/week: 2.0 standard drinks     Types: 2 Glasses of wine per week     Comment: socially     Drug use: Never    Sexual activity: Yes     Birth control/protection: See Surgical Hx        Family History   Problem Relation Age of Onset    Cancer Mother          Current Outpatient Medications:     FEROSUL 325 mg (65 mg iron) Tab tablet, Take by mouth daily as needed., Disp: , Rfl:     loratadine (CLARITIN) 10 mg tablet, Take 10 mg by mouth once daily., Disp: , Rfl:     metoprolol succinate (TOPROL-XL) 50 MG 24 hr tablet, Take 50 mg by mouth once daily., Disp: , Rfl:     sacubitriL-valsartan (ENTRESTO) 24-26 mg per tablet, Take by mouth., Disp: , Rfl:      ROS:                                                                                                                                                                             Review of Systems   Constitutional: Negative.    Respiratory: Negative.    Cardiovascular: Positive for palpitations.   Gastrointestinal: Negative.    Musculoskeletal: Negative.    Skin: Negative.    Neurological: Negative.    Psychiatric/Behavioral: Negative.         Blood pressure 138/72, pulse 89, resp. rate 18, height 5' 1.98" (1.574 m), weight (!) 169.8 kg (374 lb 5.5 oz), SpO2 100 %.   PE:  Physical Exam  Constitutional:       Appearance: Normal appearance.   HENT:      Head: Normocephalic and atraumatic.   Eyes:      Extraocular Movements: Extraocular movements intact.      Pupils: Pupils are equal, round, and reactive to light.   Cardiovascular:      Rate and Rhythm: Normal rate and regular rhythm.   Pulmonary:      Effort: Pulmonary effort is normal.      Breath sounds: Normal breath sounds.   Abdominal:      Palpations: Abdomen is soft.   Musculoskeletal:         General: Normal range of motion.      Cervical back: Normal range of motion. No tenderness.   Skin:     General: " Skin is warm and dry.   Neurological:      General: No focal deficit present.      Mental Status: She is alert and oriented to person, place, and time.   Psychiatric:         Mood and Affect: Mood normal.         Behavior: Behavior normal.       ASSESSMENT/PLAN:  Abnormal nuclear stress test  Lexiscan stress test- Anterior and apical defect, medium size, mild to moderate intensity with reversibility (5.25.21)  s/p LHC 7.26.21 - no coronary stenosis  Denies chest pain or shortness of breath    HFrEF/CMO - EF improved to 50-55% per Echo Oct. 2021  NYHA Class I  LVEF - 40% per ECHO 3.9.21   s/p LHC 7.26.21 - no coronary stenosis  Denies SOB or activity limitations  Continue GDMT: Continue Metoprolol Succinate and Entresto  Counseled patient on low-sodium diet, 2 gm max daily    HTN  BP at goal - 138/72  Continue Metoprolol Succinate and Entresto  Counseled on low-sodium diet    Palpitations  48-hour Holter monitor -revealed sinus rhythm with episodes of sinus tachycardia with an average heart rate of 81 bpm. Heart rates greater than 120 bpm were noted 2% of the time. There were no episodes of bradycardia noted. She also noted to have occasional PVCs and PACs. (March 2021)  Continue Metoprolol Succinate 50mg daily    EKG today  Follow up in Cardiology Clinic in 6 months

## 2022-09-18 ENCOUNTER — HISTORICAL (OUTPATIENT)
Dept: ADMINISTRATIVE | Facility: HOSPITAL | Age: 42
End: 2022-09-18
Payer: MEDICAID

## 2022-11-22 ENCOUNTER — HOSPITAL ENCOUNTER (EMERGENCY)
Facility: HOSPITAL | Age: 42
Discharge: HOME | End: 2022-11-22
Attending: EMERGENCY MEDICINE
Payer: MEDICAID

## 2022-11-22 VITALS
RESPIRATION RATE: 20 BRPM | SYSTOLIC BLOOD PRESSURE: 127 MMHG | OXYGEN SATURATION: 97 % | BODY MASS INDEX: 31.28 KG/M2 | HEART RATE: 94 BPM | HEIGHT: 62 IN | WEIGHT: 170 LBS | TEMPERATURE: 98 F | DIASTOLIC BLOOD PRESSURE: 74 MMHG

## 2022-11-22 DIAGNOSIS — J01.90 ACUTE BACTERIAL SINUSITIS: Primary | ICD-10-CM

## 2022-11-22 DIAGNOSIS — R05.9 COUGH: ICD-10-CM

## 2022-11-22 DIAGNOSIS — B96.89 ACUTE BACTERIAL SINUSITIS: Primary | ICD-10-CM

## 2022-11-22 LAB
B-HCG UR QL: NEGATIVE
CTP QC/QA: YES

## 2022-11-22 PROCEDURE — 81025 URINE PREGNANCY TEST: CPT | Performed by: PHYSICIAN ASSISTANT

## 2022-11-22 PROCEDURE — 99284 EMERGENCY DEPT VISIT MOD MDM: CPT | Mod: 25

## 2022-11-22 RX ORDER — AMOXICILLIN AND CLAVULANATE POTASSIUM 875; 125 MG/1; MG/1
1 TABLET, FILM COATED ORAL 2 TIMES DAILY
Qty: 10 TABLET | Refills: 0 | Status: SHIPPED | OUTPATIENT
Start: 2022-11-22 | End: 2022-11-27

## 2022-11-22 RX ORDER — BENZONATATE 100 MG/1
200 CAPSULE ORAL 3 TIMES DAILY PRN
Qty: 30 CAPSULE | Refills: 0 | Status: SHIPPED | OUTPATIENT
Start: 2022-11-22 | End: 2022-12-02

## 2022-11-23 NOTE — ED PROVIDER NOTES
Encounter Date: 11/22/2022       History     Chief Complaint   Patient presents with    Cough     Osiris Guillen is a 42 y.o. female with a history of HTN and anemia who presents to the ED complaining of a cough. She reports a cough productive of green sputum x 1 month. She was seen in urgent care and prescribed codeine and reports it has given her minimal relief. She reports nasal congestion. She denies fevers, chills, sore throat, SOB, abdominal pain, sick contacts.     The history is provided by the patient. No  was used.   Review of patient's allergies indicates:  No Known Allergies  Past Medical History:   Diagnosis Date    Abnormal Pap smear of cervix     Anemia     Hypertension      Past Surgical History:   Procedure Laterality Date    TUBAL LIGATION       Family History   Problem Relation Age of Onset    Cancer Mother      Social History     Tobacco Use    Smoking status: Never    Smokeless tobacco: Never   Substance Use Topics    Alcohol use: Yes     Alcohol/week: 2.0 standard drinks     Types: 2 Glasses of wine per week     Comment: socially     Drug use: Never     Review of Systems   Constitutional:  Negative for chills and fever.   HENT:  Positive for congestion. Negative for rhinorrhea.    Eyes:  Negative for redness and itching.   Respiratory:  Positive for cough. Negative for shortness of breath.    Cardiovascular:  Negative for chest pain and leg swelling.   Gastrointestinal:  Negative for abdominal pain and constipation.   Genitourinary:  Negative for dysuria and frequency.   Musculoskeletal:  Negative for arthralgias and back pain.   Skin:  Negative for rash and wound.   Neurological:  Negative for dizziness and headaches.   Psychiatric/Behavioral:  Negative for agitation and confusion.      Physical Exam     Initial Vitals [11/22/22 2213]   BP Pulse Resp Temp SpO2   127/74 94 20 98.2 °F (36.8 °C) 97 %      MAP       --         Physical Exam    Nursing note and vitals  reviewed.  Constitutional: She appears well-developed and well-nourished. No distress.   HENT:   Head: Normocephalic and atraumatic.   Mouth/Throat: No oropharyngeal exudate.   Nasal congestion    Eyes: EOM are normal. No scleral icterus.   Neck: Neck supple.   Normal range of motion.  Cardiovascular:  Normal rate and regular rhythm.           No murmur heard.  Pulmonary/Chest: No respiratory distress. She has no wheezes.   Abdominal: Abdomen is soft. She exhibits no distension. There is no abdominal tenderness.   Musculoskeletal:         General: No tenderness. Normal range of motion.      Cervical back: Normal range of motion and neck supple.     Neurological: She is alert and oriented to person, place, and time. No cranial nerve deficit.   Skin: Skin is warm and dry. Capillary refill takes less than 2 seconds. No erythema.   Psychiatric: She has a normal mood and affect. Thought content normal.       ED Course   Procedures  Labs Reviewed   POCT URINE PREGNANCY          Imaging Results              X-Ray Chest AP Portable (Preliminary result)  Result time 11/22/22 23:12:03      ED Interpretation by Jacquie Reed PA-C (11/22/22 23:12:03, [facility], Emergency Medicine)    No acute process identified                                     Medications - No data to display                           Clinical Impression:   Final diagnoses:  [R05.9] Cough  [J01.90, B96.89] Acute bacterial sinusitis (Primary)        ED Disposition Condition    Discharge Stable          ED Prescriptions       Medication Sig Dispense Start Date End Date Auth. Provider    amoxicillin-clavulanate 875-125mg (AUGMENTIN) 875-125 mg per tablet Take 1 tablet by mouth 2 (two) times daily. for 5 days 10 tablet 11/22/2022 11/27/2022 Jacquie Reed PA-C    benzonatate (TESSALON) 100 MG capsule Take 2 capsules (200 mg total) by mouth 3 (three) times daily as needed for Cough. 30 capsule 11/22/2022 12/2/2022 Jacquie SINHA  FLETCHER Reed          Follow-up Information       Follow up With Specialties Details Why Contact Info    Zeynep Smith, NATALI Nurse Practitioner In 3 days Hospital follow up [address] [facility] [address]  [phone]      [facility] Emergency Medicine  If symptoms worsen [address] [facility] [address] [phone]             Jacquie Reed PA-C  11/22/22 0825

## 2023-04-24 ENCOUNTER — OFFICE VISIT (OUTPATIENT)
Dept: INTERNAL MEDICINE | Facility: CLINIC | Age: 43
End: 2023-04-24
Payer: MEDICAID

## 2023-04-24 VITALS
BODY MASS INDEX: 30.8 KG/M2 | RESPIRATION RATE: 16 BRPM | DIASTOLIC BLOOD PRESSURE: 81 MMHG | SYSTOLIC BLOOD PRESSURE: 128 MMHG | HEIGHT: 62 IN | WEIGHT: 167.38 LBS | HEART RATE: 96 BPM | TEMPERATURE: 98 F

## 2023-04-24 DIAGNOSIS — D50.0 IRON DEFICIENCY ANEMIA DUE TO CHRONIC BLOOD LOSS: ICD-10-CM

## 2023-04-24 DIAGNOSIS — I10 PRIMARY HYPERTENSION: Primary | ICD-10-CM

## 2023-04-24 DIAGNOSIS — R00.2 PALPITATIONS: ICD-10-CM

## 2023-04-24 DIAGNOSIS — Z12.31 VISIT FOR SCREENING MAMMOGRAM: ICD-10-CM

## 2023-04-24 DIAGNOSIS — Z00.00 WELLNESS EXAMINATION: ICD-10-CM

## 2023-04-24 DIAGNOSIS — I50.20 HEART FAILURE WITH REDUCED EJECTION FRACTION: ICD-10-CM

## 2023-04-24 DIAGNOSIS — Z11.9 SCREENING EXAMINATION FOR INFECTIOUS DISEASE: ICD-10-CM

## 2023-04-24 DIAGNOSIS — I10 PRIMARY HYPERTENSION: ICD-10-CM

## 2023-04-24 DIAGNOSIS — J30.9 CHRONIC ALLERGIC RHINITIS: ICD-10-CM

## 2023-04-24 DIAGNOSIS — Z00.00 WELLNESS EXAMINATION: Primary | ICD-10-CM

## 2023-04-24 PROCEDURE — 3074F PR MOST RECENT SYSTOLIC BLOOD PRESSURE < 130 MM HG: ICD-10-PCS | Mod: CPTII,,, | Performed by: NURSE PRACTITIONER

## 2023-04-24 PROCEDURE — 99214 OFFICE O/P EST MOD 30 MIN: CPT | Mod: PBBFAC | Performed by: NURSE PRACTITIONER

## 2023-04-24 PROCEDURE — 4010F PR ACE/ARB THEARPY RXD/TAKEN: ICD-10-PCS | Mod: CPTII,,, | Performed by: NURSE PRACTITIONER

## 2023-04-24 PROCEDURE — 3061F NEG MICROALBUMINURIA REV: CPT | Mod: CPTII,,, | Performed by: NURSE PRACTITIONER

## 2023-04-24 PROCEDURE — 1160F PR REVIEW ALL MEDS BY PRESCRIBER/CLIN PHARMACIST DOCUMENTED: ICD-10-PCS | Mod: CPTII,,, | Performed by: NURSE PRACTITIONER

## 2023-04-24 PROCEDURE — 1160F RVW MEDS BY RX/DR IN RCRD: CPT | Mod: CPTII,,, | Performed by: NURSE PRACTITIONER

## 2023-04-24 PROCEDURE — 99396 PREV VISIT EST AGE 40-64: CPT | Mod: S$PBB,25,, | Performed by: NURSE PRACTITIONER

## 2023-04-24 PROCEDURE — 3066F NEPHROPATHY DOC TX: CPT | Mod: CPTII,,, | Performed by: NURSE PRACTITIONER

## 2023-04-24 PROCEDURE — 99396 PR PREVENTIVE VISIT,EST,40-64: ICD-10-PCS | Mod: S$PBB,25,, | Performed by: NURSE PRACTITIONER

## 2023-04-24 PROCEDURE — 3061F PR NEG MICROALBUMINURIA RESULT DOCUMENTED/REVIEW: ICD-10-PCS | Mod: CPTII,,, | Performed by: NURSE PRACTITIONER

## 2023-04-24 PROCEDURE — 3008F PR BODY MASS INDEX (BMI) DOCUMENTED: ICD-10-PCS | Mod: CPTII,,, | Performed by: NURSE PRACTITIONER

## 2023-04-24 PROCEDURE — 1159F PR MEDICATION LIST DOCUMENTED IN MEDICAL RECORD: ICD-10-PCS | Mod: CPTII,,, | Performed by: NURSE PRACTITIONER

## 2023-04-24 PROCEDURE — 3008F BODY MASS INDEX DOCD: CPT | Mod: CPTII,,, | Performed by: NURSE PRACTITIONER

## 2023-04-24 PROCEDURE — 3066F PR DOCUMENTATION OF TREATMENT FOR NEPHROPATHY: ICD-10-PCS | Mod: CPTII,,, | Performed by: NURSE PRACTITIONER

## 2023-04-24 PROCEDURE — 3079F PR MOST RECENT DIASTOLIC BLOOD PRESSURE 80-89 MM HG: ICD-10-PCS | Mod: CPTII,,, | Performed by: NURSE PRACTITIONER

## 2023-04-24 PROCEDURE — 3074F SYST BP LT 130 MM HG: CPT | Mod: CPTII,,, | Performed by: NURSE PRACTITIONER

## 2023-04-24 PROCEDURE — 99212 PR OFFICE/OUTPT VISIT, EST, LEVL II, 10-19 MIN: ICD-10-PCS | Mod: S$PBB,25,, | Performed by: NURSE PRACTITIONER

## 2023-04-24 PROCEDURE — 4010F ACE/ARB THERAPY RXD/TAKEN: CPT | Mod: CPTII,,, | Performed by: NURSE PRACTITIONER

## 2023-04-24 PROCEDURE — 3079F DIAST BP 80-89 MM HG: CPT | Mod: CPTII,,, | Performed by: NURSE PRACTITIONER

## 2023-04-24 PROCEDURE — 99212 OFFICE O/P EST SF 10 MIN: CPT | Mod: S$PBB,25,, | Performed by: NURSE PRACTITIONER

## 2023-04-24 PROCEDURE — 1159F MED LIST DOCD IN RCRD: CPT | Mod: CPTII,,, | Performed by: NURSE PRACTITIONER

## 2023-04-24 RX ORDER — LORATADINE 10 MG/1
10 TABLET ORAL NIGHTLY
Qty: 90 TABLET | Refills: 3 | Status: SHIPPED | OUTPATIENT
Start: 2023-04-24

## 2023-04-24 RX ORDER — FLUTICASONE PROPIONATE 50 MCG
1 SPRAY, SUSPENSION (ML) NASAL DAILY
Qty: 16 G | Refills: 6 | Status: SHIPPED | OUTPATIENT
Start: 2023-04-24

## 2023-04-24 NOTE — PROGRESS NOTES
Zeynep L Luis, NP   [facility]  [address] [facility] [address]      PATIENT NAME: Osiris Guillen  : 1980  DATE: 23  MRN: [id number]        Reason for Visit / Chief Complaint: Annual Exam       History of Present Illness / Problem Focused Workflow     Osiris Guillen presents to the clinic with Annual Exam     42-year-old pleasant  female for wellness visit.  Past medical history includes hypertension, heart failure, palpitations, anemia.  /81.  She continues to follow [facility] cardiology clinic.  She is compliant with medications as per medication list.  She endorses runny nose, scratchy throat and cough periodically through the year.  She was last seen for this November of last year and was treated with antibiotics.  She denies any fever, chills, night sweats, lymphadenopathy, chest pain, shortness the breath.  She usually takes Benadryl with relief in symptoms.  She is not on maintenance allergy relief medications.  Lab results completed today and all were reviewed.  She is currently menstruating at this time.  Compliant with iron pills.  She is established with [facility] gynecology clinic.  Weight today 167 lb.  She continues to be active especially with work.  She works for [employer] for 4 years now.  Otherwise, denies any other concerns or complaints, abdominal pain, nausea, vomiting, diarrhea, hematochezia or hematuria.    Other providers  [facility] cardiology   [facility] gynecology      Review of Systems     Review of Systems   Constitutional: Negative.    HENT:  Positive for rhinorrhea.    Eyes: Negative.    Respiratory: Negative.     Cardiovascular:  Positive for palpitations.   Gastrointestinal: Negative.    Endocrine: Negative.    Genitourinary: Negative.    Musculoskeletal: Negative.    Skin: Negative.    Allergic/Immunologic: Negative.    Neurological: Negative.    Hematological: Negative.    Psychiatric/Behavioral: Negative.   "     Medical / Social / Family History     ----------------------------  Abnormal Pap smear of cervix  Anemia  Hypertension     Past Surgical History:   Procedure Laterality Date     SECTION  2011    TUBAL LIGATION         Social History     Socioeconomic History    Marital status: Single   Tobacco Use    Smoking status: Never    Smokeless tobacco: Never   Substance and Sexual Activity    Alcohol use: Yes     Comment: socially     Drug use: Never    Sexual activity: Yes     Partners: Male     Birth control/protection: See Surgical Hx     Social Determinants of Health     Physical Activity: Sufficiently Active    Days of Exercise per Week: 6 days    Minutes of Exercise per Session: 30 min        Family History   Problem Relation Age of Onset    Thyroid cancer Mother         thymus    Diabetes Mellitus Mother     Heart attack Father     Diabetes Mellitus Father     Diabetes Mellitus Sister         Medications and Allergies     Medications  Current Outpatient Medications   Medication Instructions    FEROSUL 325 mg (65 mg iron) Tab tablet TAKE 1 TABLET BY MOUTH DAILY. MAY TAKE WITH FOOD TO MINIMIZE ABDOMINAL DISCOMFORT.    fluticasone propionate (FLONASE) 50 mcg, Each Nostril, Daily    loratadine (CLARITIN) 10 mg, Oral, Nightly    metoprolol succinate (TOPROL-XL) 50 mg, Oral, Daily    sacubitriL-valsartan (ENTRESTO) 24-26 mg per tablet 1 tablet, Oral, 2 times daily       Allergies  Review of patient's allergies indicates:  No Known Allergies    Physical Examination     Visit Vitals  /81 (BP Location: Left arm, Patient Position: Sitting, BP Method: X-Large (Automatic))   Pulse 96   Temp 98.4 °F (36.9 °C) (Oral)   Resp 16   Ht 5' 2" (1.575 m)   Wt 75.9 kg (167 lb 6.4 oz)   BMI 30.62 kg/m²       Physical Exam  Vitals and nursing note reviewed.   Constitutional:       Appearance: Normal appearance. She is not ill-appearing.   HENT:      Head: Normocephalic.      Right Ear: Tympanic membrane normal.      " Left Ear: Tympanic membrane normal.      Nose: Nose normal.      Mouth/Throat:      Mouth: Mucous membranes are moist.   Eyes:      Extraocular Movements: Extraocular movements intact.      Conjunctiva/sclera: Conjunctivae normal.      Pupils: Pupils are equal, round, and reactive to light.   Neck:      Vascular: No carotid bruit.   Cardiovascular:      Rate and Rhythm: Normal rate and regular rhythm.      Pulses: Normal pulses.   Pulmonary:      Effort: Pulmonary effort is normal. No respiratory distress.      Breath sounds: Normal breath sounds.   Abdominal:      General: Abdomen is flat. Bowel sounds are normal. There is no distension.      Palpations: Abdomen is soft. There is no mass.      Tenderness: There is no abdominal tenderness.      Hernia: No hernia is present.   Musculoskeletal:         General: Normal range of motion.      Cervical back: Normal range of motion and neck supple.      Right lower leg: No edema.      Left lower leg: No edema.   Lymphadenopathy:      Cervical: No cervical adenopathy.   Skin:     General: Skin is warm and dry.      Capillary Refill: Capillary refill takes less than 2 seconds.   Neurological:      Mental Status: She is alert and oriented to person, place, and time. Mental status is at baseline.      Motor: No weakness.   Psychiatric:         Mood and Affect: Mood normal.         Behavior: Behavior normal.         Thought Content: Thought content normal.         Judgment: Judgment normal.         Results     Lab Results   Component Value Date    WBC 4.3 (L) 04/24/2023    RBC 4.68 04/24/2023    HGB 12.5 04/24/2023    HCT 39.6 04/24/2023    MCV 84.6 04/24/2023    MCH 26.7 (L) 04/24/2023    MCHC 31.6 (L) 04/24/2023    RDW 13.4 04/24/2023     04/24/2023    MPV 11.4 (H) 04/24/2023     Sodium Level   Date Value Ref Range Status   04/24/2023 140 136 - 145 mmol/L Final     Potassium Level   Date Value Ref Range Status   04/24/2023 4.2 3.5 - 5.1 mmol/L Final     Carbon Dioxide    Date Value Ref Range Status   04/24/2023 24 22 - 29 mmol/L Final     Blood Urea Nitrogen   Date Value Ref Range Status   04/24/2023 11.4 7.0 - 18.7 mg/dL Final     Creatinine   Date Value Ref Range Status   04/24/2023 0.86 0.55 - 1.02 mg/dL Final     Calcium Level Total   Date Value Ref Range Status   04/24/2023 9.0 8.4 - 10.2 mg/dL Final     Albumin Level   Date Value Ref Range Status   04/24/2023 4.0 3.5 - 5.0 g/dL Final     Bilirubin Total   Date Value Ref Range Status   04/24/2023 0.2 <=1.5 mg/dL Final     Alkaline Phosphatase   Date Value Ref Range Status   04/24/2023 46 40 - 150 unit/L Final     Aspartate Aminotransferase   Date Value Ref Range Status   04/24/2023 16 5 - 34 unit/L Final     Alanine Aminotransferase   Date Value Ref Range Status   04/24/2023 14 0 - 55 unit/L Final     Estimated GFR-Non    Date Value Ref Range Status   04/25/2022 88 >=90      Lab Results   Component Value Date    CHOL 163 04/24/2023     Lab Results   Component Value Date    HDL 35 04/24/2023     No results found for: LDLCALC  Lab Results   Component Value Date    TRIG 81 04/24/2023     No results found for: CHOLHDL  Lab Results   Component Value Date    TSH 1.823 04/24/2023     Lab Results   Component Value Date    PHUR 6 07/06/2019    SPECGRAV 1.025 07/06/2019    KETONESU Negative 07/06/2019    NITRITE Negative 07/06/2019    LEUKOCYTESUR Negative 07/06/2019     Lab Results   Component Value Date    HGBA1C 5.1 04/24/2023    HGBA1C 5.1 04/25/2022    HGBA1C 5.3 01/26/2021     No results found for: MICROALBUR, [id number]   No results found for this or any previous visit.         Assessment       ICD-10-CM ICD-9-CM   1. Wellness examination  Z00.00 V70.0   2. Chronic allergic rhinitis  J30.9 477.9   3. Primary hypertension  I10 401.9   4. Palpitations  R00.2 785.1   5. Heart failure with reduced ejection fraction  I50.20 428.20   6. Visit for screening mammogram  Z12.31 V76.12   7. Iron deficiency anemia due to  chronic blood loss  D50.0 280.0       Plan       1. Chronic allergic rhinitis  Patient to begin Claritin and Flonase as directed.  Notify provider if symptoms do not improve.  - loratadine (CLARITIN) 10 mg tablet; Take 1 tablet (10 mg total) by mouth every evening.  Dispense: 90 tablet; Refill: 3  - fluticasone propionate (FLONASE) 50 mcg/actuation nasal spray; 1 spray (50 mcg total) by Each Nostril route once daily.  Dispense: 16 g; Refill: 6    2. Primary hypertension  /81  Follow low sodium diet, < 2 gm/day (avoid high salty foods such as processed meats/ sausage/montana/ sandwich meat, chips, pickles, cheese, crackers and soft drinks/ electrolyte replacement drinks).  Avoid tobacco/ alcohol use  Educated on health benefits of at least 5 days/ week of 30 minutes moderate intensity exercise (brisk walking) and 2 or more days/ week of muscle strength activities  Daily ASA 81 mg for CV prevention  Continue current medication regimen    - CBC Auto Differential; Future  - Comprehensive Metabolic Panel; Future  - Microalbumin/Creatinine Ratio, Urine  - TSH; Future    3. Palpitations  Stable and controlled.  Continue beta-blocker as prescribed and keep cardiology follow ups.  Avoid caffeine.  - CBC Auto Differential; Future  - TSH; Future    4. Heart failure with reduced ejection fraction  Stable.  Continue medications as prescribed and keep follow-up with Cardiology.  - CBC Auto Differential; Future  - Comprehensive Metabolic Panel; Future  - TSH; Future    5. Visit for screening mammogram    - Mammo Digital Screening Bilat w/ Hermilo; Future    6. Wellness examination  Regular exercise as tolerated   Avoid tobacco/alcohol/drugs   Healthy lifestyle habits  Wellness labs completed and reviewed  - CBC Auto Differential; Future  - Comprehensive Metabolic Panel; Future    7. Iron deficiency anemia due to chronic blood loss  H and H overall stable.  Patient to continue oral iron supplements.  She is currently menstruating.   She denies any symptoms.  - CBC Auto Differential; Future      Future Appointments   Date Time Provider Department Center   6/19/2023  8:30 AM PRANAY Ng [facility] GYN [place]   6/19/2023  1:45 PM RANDI Levine [facility] CARD [place]   4/22/2024  9:00 AM [facility]        Follow up in about 1 year (around 4/24/2024).    Signature:  Zeynep Smith NP  [facility]  [address] [facility] [address]    Date of encounter: 4/24/23

## 2023-04-26 ENCOUNTER — DOCUMENTATION ONLY (OUTPATIENT)
Dept: ADMINISTRATIVE | Facility: HOSPITAL | Age: 43
End: 2023-04-26
Payer: MEDICAID

## 2023-06-19 ENCOUNTER — HOSPITAL ENCOUNTER (OUTPATIENT)
Dept: RADIOLOGY | Facility: HOSPITAL | Age: 43
Discharge: HOME | End: 2023-06-19
Attending: NURSE PRACTITIONER
Payer: MEDICAID

## 2023-06-19 ENCOUNTER — OFFICE VISIT (OUTPATIENT)
Dept: GYNECOLOGY | Facility: CLINIC | Age: 43
End: 2023-06-19
Payer: MEDICAID

## 2023-06-19 ENCOUNTER — OFFICE VISIT (OUTPATIENT)
Dept: CARDIOLOGY | Facility: CLINIC | Age: 43
End: 2023-06-19
Payer: MEDICAID

## 2023-06-19 VITALS
SYSTOLIC BLOOD PRESSURE: 122 MMHG | HEART RATE: 77 BPM | SYSTOLIC BLOOD PRESSURE: 136 MMHG | WEIGHT: 166 LBS | HEIGHT: 62 IN | DIASTOLIC BLOOD PRESSURE: 88 MMHG | HEIGHT: 62 IN | TEMPERATURE: 98 F | RESPIRATION RATE: 20 BRPM | DIASTOLIC BLOOD PRESSURE: 80 MMHG | WEIGHT: 163.63 LBS | TEMPERATURE: 99 F | BODY MASS INDEX: 30.11 KG/M2 | BODY MASS INDEX: 30.55 KG/M2 | OXYGEN SATURATION: 100 % | HEART RATE: 72 BPM | RESPIRATION RATE: 18 BRPM | OXYGEN SATURATION: 100 %

## 2023-06-19 DIAGNOSIS — I10 PRIMARY HYPERTENSION: Primary | ICD-10-CM

## 2023-06-19 DIAGNOSIS — R00.2 PALPITATIONS: ICD-10-CM

## 2023-06-19 DIAGNOSIS — Z12.31 VISIT FOR SCREENING MAMMOGRAM: ICD-10-CM

## 2023-06-19 DIAGNOSIS — Z12.4 PAP SMEAR FOR CERVICAL CANCER SCREENING: Primary | ICD-10-CM

## 2023-06-19 DIAGNOSIS — I50.20 HEART FAILURE WITH REDUCED EJECTION FRACTION: ICD-10-CM

## 2023-06-19 PROCEDURE — 3008F BODY MASS INDEX DOCD: CPT | Mod: CPTII,,, | Performed by: NURSE PRACTITIONER

## 2023-06-19 PROCEDURE — 1159F MED LIST DOCD IN RCRD: CPT | Mod: CPTII,,, | Performed by: NURSE PRACTITIONER

## 2023-06-19 PROCEDURE — 99396 PR PREVENTIVE VISIT,EST,40-64: ICD-10-PCS | Mod: S$PBB,,, | Performed by: NURSE PRACTITIONER

## 2023-06-19 PROCEDURE — 77063 MAMMO DIGITAL SCREENING BILAT WITH TOMO: ICD-10-PCS | Mod: 26,,, | Performed by: RADIOLOGY

## 2023-06-19 PROCEDURE — 77067 SCR MAMMO BI INCL CAD: CPT | Mod: 26,,, | Performed by: RADIOLOGY

## 2023-06-19 PROCEDURE — 88174 CYTOPATH C/V AUTO IN FLUID: CPT | Performed by: NURSE PRACTITIONER

## 2023-06-19 PROCEDURE — 1159F PR MEDICATION LIST DOCUMENTED IN MEDICAL RECORD: ICD-10-PCS | Mod: CPTII,,, | Performed by: NURSE PRACTITIONER

## 2023-06-19 PROCEDURE — 3079F PR MOST RECENT DIASTOLIC BLOOD PRESSURE 80-89 MM HG: ICD-10-PCS | Mod: CPTII,,, | Performed by: NURSE PRACTITIONER

## 2023-06-19 PROCEDURE — 1160F RVW MEDS BY RX/DR IN RCRD: CPT | Mod: CPTII,,, | Performed by: NURSE PRACTITIONER

## 2023-06-19 PROCEDURE — 4010F PR ACE/ARB THEARPY RXD/TAKEN: ICD-10-PCS | Mod: CPTII,,, | Performed by: NURSE PRACTITIONER

## 2023-06-19 PROCEDURE — 87624 HPV HI-RISK TYP POOLED RSLT: CPT

## 2023-06-19 PROCEDURE — 3061F NEG MICROALBUMINURIA REV: CPT | Mod: CPTII,,, | Performed by: NURSE PRACTITIONER

## 2023-06-19 PROCEDURE — 3079F DIAST BP 80-89 MM HG: CPT | Mod: CPTII,,, | Performed by: NURSE PRACTITIONER

## 2023-06-19 PROCEDURE — 4010F ACE/ARB THERAPY RXD/TAKEN: CPT | Mod: CPTII,,, | Performed by: NURSE PRACTITIONER

## 2023-06-19 PROCEDURE — 77067 SCR MAMMO BI INCL CAD: CPT | Mod: TC

## 2023-06-19 PROCEDURE — 99213 OFFICE O/P EST LOW 20 MIN: CPT | Mod: PBBFAC | Performed by: NURSE PRACTITIONER

## 2023-06-19 PROCEDURE — 3066F PR DOCUMENTATION OF TREATMENT FOR NEPHROPATHY: ICD-10-PCS | Mod: CPTII,,, | Performed by: NURSE PRACTITIONER

## 2023-06-19 PROCEDURE — 99214 OFFICE O/P EST MOD 30 MIN: CPT | Mod: PBBFAC,27 | Performed by: NURSE PRACTITIONER

## 2023-06-19 PROCEDURE — 3008F PR BODY MASS INDEX (BMI) DOCUMENTED: ICD-10-PCS | Mod: CPTII,,, | Performed by: NURSE PRACTITIONER

## 2023-06-19 PROCEDURE — 3074F SYST BP LT 130 MM HG: CPT | Mod: CPTII,,, | Performed by: NURSE PRACTITIONER

## 2023-06-19 PROCEDURE — 3066F NEPHROPATHY DOC TX: CPT | Mod: CPTII,,, | Performed by: NURSE PRACTITIONER

## 2023-06-19 PROCEDURE — 77067 MAMMO DIGITAL SCREENING BILAT WITH TOMO: ICD-10-PCS | Mod: 26,,, | Performed by: RADIOLOGY

## 2023-06-19 PROCEDURE — 99214 PR OFFICE/OUTPT VISIT, EST, LEVL IV, 30-39 MIN: ICD-10-PCS | Mod: S$PBB,,, | Performed by: NURSE PRACTITIONER

## 2023-06-19 PROCEDURE — 99214 OFFICE O/P EST MOD 30 MIN: CPT | Mod: S$PBB,,, | Performed by: NURSE PRACTITIONER

## 2023-06-19 PROCEDURE — 3075F SYST BP GE 130 - 139MM HG: CPT | Mod: CPTII,,, | Performed by: NURSE PRACTITIONER

## 2023-06-19 PROCEDURE — 3075F PR MOST RECENT SYSTOLIC BLOOD PRESS GE 130-139MM HG: ICD-10-PCS | Mod: CPTII,,, | Performed by: NURSE PRACTITIONER

## 2023-06-19 PROCEDURE — 1160F PR REVIEW ALL MEDS BY PRESCRIBER/CLIN PHARMACIST DOCUMENTED: ICD-10-PCS | Mod: CPTII,,, | Performed by: NURSE PRACTITIONER

## 2023-06-19 PROCEDURE — 3074F PR MOST RECENT SYSTOLIC BLOOD PRESSURE < 130 MM HG: ICD-10-PCS | Mod: CPTII,,, | Performed by: NURSE PRACTITIONER

## 2023-06-19 PROCEDURE — 3061F PR NEG MICROALBUMINURIA RESULT DOCUMENTED/REVIEW: ICD-10-PCS | Mod: CPTII,,, | Performed by: NURSE PRACTITIONER

## 2023-06-19 PROCEDURE — 77063 BREAST TOMOSYNTHESIS BI: CPT | Mod: 26,,, | Performed by: RADIOLOGY

## 2023-06-19 PROCEDURE — 99396 PREV VISIT EST AGE 40-64: CPT | Mod: S$PBB,,, | Performed by: NURSE PRACTITIONER

## 2023-06-19 RX ORDER — METOPROLOL SUCCINATE 50 MG/1
50 TABLET, EXTENDED RELEASE ORAL DAILY
Qty: 30 TABLET | Refills: 11 | Status: SHIPPED | OUTPATIENT
Start: 2023-06-19 | End: 2024-06-18

## 2023-06-19 RX ORDER — SACUBITRIL AND VALSARTAN 24; 26 MG/1; MG/1
1 TABLET, FILM COATED ORAL 2 TIMES DAILY
Qty: 60 TABLET | Refills: 11 | Status: SHIPPED | OUTPATIENT
Start: 2023-06-19 | End: 2024-06-18

## 2023-06-19 NOTE — PROGRESS NOTES
"  Subjective:       Patient ID: Osiris Guillen is a 42 y.o. female.    Chief Complaint:  Well Woman      History of Present Illness  The patient  here for annual exam. Her LMP was 23. Period last 5 days and changes pads 3-4x/day. Hx of uterine fibroid, takes daily iron, states cycles are manageable. Admits history of abnormal pap with colpo in 2017. Pap was ASCUS and HPV positive, ECC benign. Pap in -NIL and HPV neg. MG-22-BIRADS 2,MG today. Pt had Lt breast bx which showed fibroadenamatoid changes, she has referral to breast clinic. N/s for breast clinic in 2022. Denies urinary complaints. Denies pelvic pain or discharge. Pt reports chlamydia in the past and declines testing. HPV vaccine completed. Contraception consist of TL. Denies tobacco use. Denies fly hx of breast, ovarian, uterine or colon cancer.     GYN & OB History  Patient's last menstrual period was 2023 (exact date).   Date of Last Pap: 2021    Review of patient's allergies indicates:  No Known Allergies  Past Medical History:   Diagnosis Date    Abnormal Pap smear of cervix     Anemia     Hypertension      OB History    Para Term  AB Living   4 3     1 3   SAB IAB Ectopic Multiple Live Births   1       3      # Outcome Date GA Lbr Stas/2nd Weight Sex Delivery Anes PTL Lv   4 SAB            3 Para      CS-Unspec   SPEEDY   2 Para      CS-Unspec   SPEEDY   1 Para      CS-Unspec   SPEEDY        Review of Systems  Review of Systems    Negative except for pertinent findings for positives per HPI     Objective:    Physical Exam    /80 (BP Location: Right arm, Patient Position: Sitting, BP Method: Large (Automatic))   Pulse 72   Temp 98.8 °F (37.1 °C)   Resp 18   Ht 5' 2" (1.575 m)   Wt 75.3 kg (166 lb 0.1 oz)   LMP 2023 (Exact Date)   SpO2 100%   BMI 30.36 kg/m²   GENERAL: Well-developed female in no acute distress.  SKIN: Normal to inspection, warm and intact.  BREASTS: No masses, lumps, discharge, " tenderness.  VULVA: General appearance normal; external genitalia with no lesions or erythema.  VAGINA: Mucosa normal, pink, no abnormal discharge or lesions.  CERVIX: Grossly normal, pink, no erythema or abnormal discharge.  BIMANUAL EXAM: reveals a 10 week-sized uterus. The uterus is non tender. Noah adnexa reveal no evidence of masses, tenderness.  PSYCHIATRIC: Patient is oriented to person, place, and time. Mood and affect are normal.    Assessment:       1. Pap smear for cervical cancer screening       Plan:   Osiris was seen today for well woman.    Diagnoses and all orders for this visit:    Pap smear for cervical cancer screening  -     Liquid-Based Pap Smear, Screening Screening    Pap today  Follow up in about 1 year (around 6/19/2024) for annual exam.     No

## 2023-06-19 NOTE — PROGRESS NOTES
CHIEF COMPLAINT:   Chief Complaint   Patient presents with    6 month F/U visit     Denies any cardiac problems                    Review of patient's allergies indicates:  No Known Allergies                                       HPI:  Osiris Guillen 42 y.o. female with a past medical history of HTN, NICMO, (recovered- EF -50-55% per ECHO Oct. 2021), and palpitations who presents for routine 6 month follow up and ongoing care.  Patient completed an Echocardiogram on October 11, 2021 which revealed an ejection fraction of approximately 50 to 55%, trace MR, and mild TR, which is improved from previous EF of 40% in March 2021. The patient had a 48-hour Holter monitor in March 2021 that revealed the patient was in sinus rhythm with episodes of sinus tachycardia with an average heart rate of 81 bpm. Heart rates greater than 120 bpm were noted 2% of the time. There were no episodes of bradycardia noted. She also noted to have occasional PVCs and PACs. She completed a Lexiscan stress test on 5.25.21 that revealed possible anterior and apical ischemia. Patient subsequently underwent coronary angiogram procedure on July 26, 2021 which revealed no coronary stenosis.    Today the patient reports that she feels good.  She continues to endorse rare episodes of palpitations.  However, she states the palpitations are transient in nature and do not affect her normal ADLs.  She denies any chest pain, shortness of breath, orthopnea, PND, peripheral edema, lightheadedness, dizziness or syncope.  The patient states she is able to perform her regular activities and heavy housework without experiencing any ischemic symptoms.  She reports compliance with her current medications.         Patient Active Problem List   Diagnosis    Heart failure with reduced ejection fraction    Primary hypertension    Palpitations    Chronic allergic rhinitis    Iron deficiency anemia due to chronic blood loss     Past Surgical History:   Procedure  Laterality Date     SECTION  2011    TUBAL LIGATION       Social History     Socioeconomic History    Marital status: Single   Tobacco Use    Smoking status: Never    Smokeless tobacco: Never   Substance and Sexual Activity    Alcohol use: Yes     Comment: socially     Drug use: Never    Sexual activity: Yes     Partners: Male     Birth control/protection: See Surgical Hx     Social Determinants of Health     Physical Activity: Sufficiently Active    Days of Exercise per Week: 6 days    Minutes of Exercise per Session: 30 min        Family History   Problem Relation Age of Onset    Thyroid cancer Mother         thymus    Diabetes Mellitus Mother     Heart attack Father     Diabetes Mellitus Father     Diabetes Mellitus Sister          Current Outpatient Medications:     FEROSUL 325 mg (65 mg iron) Tab tablet, TAKE 1 TABLET BY MOUTH DAILY. MAY TAKE WITH FOOD TO MINIMIZE ABDOMINAL DISCOMFORT., Disp: 90 tablet, Rfl: 3    fluticasone propionate (FLONASE) 50 mcg/actuation nasal spray, 1 spray (50 mcg total) by Each Nostril route once daily., Disp: 16 g, Rfl: 6    loratadine (CLARITIN) 10 mg tablet, Take 1 tablet (10 mg total) by mouth every evening., Disp: 90 tablet, Rfl: 3    metoprolol succinate (TOPROL-XL) 50 MG 24 hr tablet, Take 1 tablet (50 mg total) by mouth once daily., Disp: 30 tablet, Rfl: 11    sacubitriL-valsartan (ENTRESTO) 24-26 mg per tablet, Take 1 tablet by mouth 2 (two) times daily., Disp: 60 tablet, Rfl: 11     ROS:                                                                                                                                                                             Review of Systems   Constitutional: Negative.    Respiratory: Negative.     Cardiovascular:  Positive for palpitations.   Gastrointestinal: Negative.    Musculoskeletal: Negative.    Skin: Negative.    Neurological: Negative.    Psychiatric/Behavioral: Negative.          Blood pressure 136/88, pulse 77,  "temperature 98.2 °F (36.8 °C), temperature source Oral, resp. rate 20, height 5' 2.2" (1.58 m), weight 74.2 kg (163 lb 9.6 oz), last menstrual period 06/12/2023, SpO2 100 %.   PE:  Physical Exam  Constitutional:       Appearance: Normal appearance.   HENT:      Head: Normocephalic and atraumatic.   Eyes:      Extraocular Movements: Extraocular movements intact.      Pupils: Pupils are equal, round, and reactive to light.   Cardiovascular:      Rate and Rhythm: Normal rate and regular rhythm.   Pulmonary:      Effort: Pulmonary effort is normal.      Breath sounds: Normal breath sounds.   Abdominal:      Palpations: Abdomen is soft.   Musculoskeletal:         General: Normal range of motion.      Cervical back: Normal range of motion. No tenderness.   Skin:     General: Skin is warm and dry.   Neurological:      General: No focal deficit present.      Mental Status: She is alert and oriented to person, place, and time.   Psychiatric:         Mood and Affect: Mood normal.         Behavior: Behavior normal.     ASSESSMENT/PLAN:  Abnormal nuclear stress test  - Lexiscan stress test- Anterior and apical defect, medium size, mild to moderate intensity with reversibility (5.25.21)  - s/p LHC 7.26.21 - no coronary stenosis  - Denies CP or SOB    HFrEF/NICMO - EF improved to 50-55% per Echo Oct. 2021  - NYHA Class I  - LVEF - 40% per ECHO 3.9.21   - s/p LHC 7.26.21 - no coronary stenosis  - Denies SOB or  VEGA  - Euvolemic upon exam, warm and dry  - Continue GDMT: Continue Metoprolol Succinate and Entresto  - Counseled patient on low-sodium diet, 2 gm max daily    HTN  - BP at goal - 138/72  - Continue Metoprolol Succinate and Entresto  - Counseled on low-sodium diet    Palpitations  - 48-hour Holter monitor -revealed sinus rhythm with episodes of sinus tachycardia with an average heart rate of 81 bpm. Heart rates greater than 120 bpm were noted 2% of the time. There were no episodes of bradycardia noted. She also noted to " have occasional PVCs and PACs. (March 2021)  - Continue Metoprolol Succinate 50mg daily    Follow up in Cardiology Clinic in 6 months or sooner if needed  Follow-up with PCP as directed

## 2023-06-20 ENCOUNTER — TELEPHONE (OUTPATIENT)
Dept: INTERNAL MEDICINE | Facility: CLINIC | Age: 43
End: 2023-06-20
Payer: MEDICAID

## 2023-06-20 NOTE — TELEPHONE ENCOUNTER
Please let pt know mammogram results reviewed without abnormal findings; stable left breast mass that she previously had negative biopsy. Recommend continue annual screening mammograms.     Thank you

## 2023-06-21 LAB — PSYCHE PATHOLOGY RESULT: NORMAL

## 2023-11-20 DIAGNOSIS — D50.9 IRON DEFICIENCY ANEMIA, UNSPECIFIED: ICD-10-CM

## 2023-11-28 RX ORDER — FERROUS SULFATE 325(65) MG
TABLET ORAL
Qty: 90 TABLET | Refills: 3 | Status: SHIPPED | OUTPATIENT
Start: 2023-11-28

## 2024-01-24 ENCOUNTER — OFFICE VISIT (OUTPATIENT)
Dept: CARDIOLOGY | Facility: CLINIC | Age: 44
End: 2024-01-24
Payer: MEDICAID

## 2024-01-24 VITALS
HEART RATE: 96 BPM | RESPIRATION RATE: 18 BRPM | DIASTOLIC BLOOD PRESSURE: 81 MMHG | TEMPERATURE: 99 F | SYSTOLIC BLOOD PRESSURE: 137 MMHG | BODY MASS INDEX: 30.18 KG/M2 | OXYGEN SATURATION: 100 % | WEIGHT: 164 LBS | HEIGHT: 62 IN

## 2024-01-24 DIAGNOSIS — R00.0 TACHYCARDIA: ICD-10-CM

## 2024-01-24 DIAGNOSIS — I10 PRIMARY HYPERTENSION: ICD-10-CM

## 2024-01-24 DIAGNOSIS — I50.20 HEART FAILURE WITH REDUCED EJECTION FRACTION: Primary | ICD-10-CM

## 2024-01-24 PROCEDURE — 3079F DIAST BP 80-89 MM HG: CPT | Mod: CPTII,,, | Performed by: NURSE PRACTITIONER

## 2024-01-24 PROCEDURE — 4010F ACE/ARB THERAPY RXD/TAKEN: CPT | Mod: CPTII,,, | Performed by: NURSE PRACTITIONER

## 2024-01-24 PROCEDURE — 99214 OFFICE O/P EST MOD 30 MIN: CPT | Mod: PBBFAC | Performed by: NURSE PRACTITIONER

## 2024-01-24 PROCEDURE — 3075F SYST BP GE 130 - 139MM HG: CPT | Mod: CPTII,,, | Performed by: NURSE PRACTITIONER

## 2024-01-24 PROCEDURE — 93005 ELECTROCARDIOGRAM TRACING: CPT

## 2024-01-24 PROCEDURE — 3008F BODY MASS INDEX DOCD: CPT | Mod: CPTII,,, | Performed by: NURSE PRACTITIONER

## 2024-01-24 PROCEDURE — 99214 OFFICE O/P EST MOD 30 MIN: CPT | Mod: S$PBB,,, | Performed by: NURSE PRACTITIONER

## 2024-01-24 PROCEDURE — 1159F MED LIST DOCD IN RCRD: CPT | Mod: CPTII,,, | Performed by: NURSE PRACTITIONER

## 2024-01-24 PROCEDURE — 1160F RVW MEDS BY RX/DR IN RCRD: CPT | Mod: CPTII,,, | Performed by: NURSE PRACTITIONER

## 2024-01-24 NOTE — PROGRESS NOTES
CHIEF COMPLAINT:   Chief Complaint   Patient presents with    6 month follow up visit                    Review of patient's allergies indicates:  No Known Allergies                                       HPI:  Osiris Guillen 43 y.o. female with a past medical history of NICMO, (recovered- EF -50-55% per ECHO Oct. 2021), HTN and palpitations who presents to cardiology clinic for routine 6 month follow up and ongoing care. Latest Echocardiogram obtained on 10.11.21 revealed an ejection fraction of approximately 50 to 55% which is improved from previous EF of 40% per ECHO in March 2021.  The patient had an abnormal Lexiscan stress test in May 2021 that revealed possible anterior and apical ischemia. She underwent a subsequent coronary angiogram procedure on 7.26.21 that revealed no coronary stenosis.    Patient presents to clinic today reporting that she feels good.  She denies any cardiac complaints of chest pain, shortness of breath, palpitations, orthopnea, PND, peripheral edema, claudication, lightheadedness, dizziness, near-syncope or syncope.  The patient states that she is able to perform her regular activities, heavy housework and job duties working as a [employer]  without experiencing any ischemic symptoms.  She endorses compliance with her current medications and is currently tolerating without issue.      CARDIAC TESTING:  ECHO 10.11.21  Ejection fraction of approximately 50 to 55%, trace MR, and mild TR    48 Hour Holter Monitor March 2021   Sinus rhythm with episodes of sinus tachycardia with an average heart rate of 81 bpm. Heart rates greater than 120 bpm were noted 2% of the time. There were no episodes of bradycardia noted. She also noted to have occasional PVCs and PACs.       Patient Active Problem List   Diagnosis    Heart failure with reduced ejection fraction    Primary hypertension    Palpitations    Chronic allergic rhinitis    Iron deficiency anemia due to chronic blood loss     Past Surgical  History:   Procedure Laterality Date     SECTION  2011    TUBAL LIGATION       Social History     Socioeconomic History    Marital status: Single   Tobacco Use    Smoking status: Never    Smokeless tobacco: Never   Substance and Sexual Activity    Alcohol use: Yes     Comment: socially     Drug use: Never    Sexual activity: Yes     Partners: Male     Birth control/protection: See Surgical Hx     Social Determinants of Health     Physical Activity: Sufficiently Active (2023)    Exercise Vital Sign     Days of Exercise per Week: 6 days     Minutes of Exercise per Session: 30 min        Family History   Problem Relation Age of Onset    Thyroid cancer Mother         thymus    Diabetes Mellitus Mother     Heart attack Father     Diabetes Mellitus Father     Diabetes Mellitus Sister          Current Outpatient Medications:     ferrous sulfate (FEOSOL) 325 mg (65 mg iron) Tab tablet, TAKE 1 TABLET BY MOUTH DAILY. MAY TAKE WITH FOOD TO MINIMIZE ABDOMINAL DISCOMFORT., Disp: 90 tablet, Rfl: 3    fluticasone propionate (FLONASE) 50 mcg/actuation nasal spray, 1 spray (50 mcg total) by Each Nostril route once daily., Disp: 16 g, Rfl: 6    loratadine (CLARITIN) 10 mg tablet, Take 1 tablet (10 mg total) by mouth every evening., Disp: 90 tablet, Rfl: 3    metoprolol succinate (TOPROL-XL) 50 MG 24 hr tablet, Take 1 tablet (50 mg total) by mouth once daily., Disp: 30 tablet, Rfl: 11    sacubitriL-valsartan (ENTRESTO) 24-26 mg per tablet, Take 1 tablet by mouth 2 (two) times daily., Disp: 60 tablet, Rfl: 11     ROS:                                                                                                                                                                             Review of Systems   Constitutional: Negative.    Respiratory: Negative.     Cardiovascular:  Positive for palpitations.   Gastrointestinal: Negative.    Musculoskeletal: Negative.    Skin: Negative.    Neurological: Negative.   "  Psychiatric/Behavioral: Negative.          Blood pressure 137/81, pulse 96, temperature 98.6 °F (37 °C), temperature source Oral, resp. rate 18, height 5' 2" (1.575 m), weight 74.4 kg (164 lb), SpO2 100 %.   PE:  Physical Exam  Constitutional:       Appearance: Normal appearance.   HENT:      Head: Normocephalic and atraumatic.   Eyes:      Extraocular Movements: Extraocular movements intact.      Pupils: Pupils are equal, round, and reactive to light.   Cardiovascular:      Rate and Rhythm: Normal rate and regular rhythm.   Pulmonary:      Effort: Pulmonary effort is normal.      Breath sounds: Normal breath sounds.   Abdominal:      Palpations: Abdomen is soft.   Musculoskeletal:         General: Normal range of motion.      Cervical back: Normal range of motion. No tenderness.   Skin:     General: Skin is warm and dry.   Neurological:      General: No focal deficit present.      Mental Status: She is alert and oriented to person, place, and time.   Psychiatric:         Mood and Affect: Mood normal.         Behavior: Behavior normal.       ASSESSMENT/PLAN:  HFrEF/NICMO, NYHA Class I  - EF 50-55% per Echo Oct. 2021 recovered from previous EF 40% per ECHO March 2021  - [facility]  - no coronary stenosis (7.26.21)  - Lexiscan stress test- Anterior and apical defect, medium size, mild to moderate intensity with reversibility (5.25.21)  - Denies SOB or  VEGA  - Euvolemic upon exam, warm and dry  - Continue GDMT: Metoprolol Succinate 50 mg and Entresto 24/26 mg BID  - Counseled patient on low-sodium diet, 2 gm max daily    HTN  - BP at goal today   - Continue Metoprolol Succinate 50 mg and Entresto 24/26 mg BID  - Counseled on low-sodium diet      Tachycardia   - Denies palpitations  - 48-hour Holter monitor -revealed sinus rhythm with episodes of sinus tachycardia with an average heart rate of 81 bpm. Heart rates greater than 120 bpm were noted 2% of the time. There were no episodes of bradycardia noted. She also noted to " have occasional PVCs and PACs. (March 2021)  - EKG today -sinus tachycardia, HR-107. Reports heart rate is normally well controlled at home.  - Instructed patient to monitor and log her BP/HR to ensure adequate vitals  - Continue Metoprolol Succinate 50mg daily for now.  If patient remains tachycardic can consider up titrating beta-blocker      EKG  Nurse visit in 2 weeks for BP/HR check (call)  Follow up in Cardiology Clinic in 6 months or sooner if needed  Follow-up with PCP as directed

## 2024-01-24 NOTE — PATIENT INSTRUCTIONS
EKG  Nurse visit in 2 weeks for BP/HR check (call)  Follow up in Cardiology Clinic in 6 months or sooner if needed  Follow-up with PCP as directed

## 2024-04-22 ENCOUNTER — TELEPHONE (OUTPATIENT)
Dept: INTERNAL MEDICINE | Facility: CLINIC | Age: 44
End: 2024-04-22

## 2024-04-22 NOTE — TELEPHONE ENCOUNTER
----- Message from [facility] sent at 4/22/2024  7:36 AM CDT -----  .Type:  Needs Medical Advice    Who Called:  pt    Symptoms (please be specific):  no     How long has patient had these symptoms:   no    Pharmacy name and phone #:   no    Would the patient rather a call back or a response via [facility]?      Best Call Back Number:  [phone]    Additional Information:  pt will not make her appointment this morning she rescheduled until 6-11-24 a

## 2024-05-28 DIAGNOSIS — J30.9 CHRONIC ALLERGIC RHINITIS: ICD-10-CM

## 2024-05-28 DIAGNOSIS — I10 PRIMARY HYPERTENSION: ICD-10-CM

## 2024-05-28 DIAGNOSIS — I50.20 HEART FAILURE WITH REDUCED EJECTION FRACTION: ICD-10-CM

## 2024-05-28 RX ORDER — SACUBITRIL AND VALSARTAN 24; 26 MG/1; MG/1
1 TABLET, FILM COATED ORAL 2 TIMES DAILY
Qty: 60 TABLET | Refills: 11 | Status: SHIPPED | OUTPATIENT
Start: 2024-05-28 | End: 2025-05-28

## 2024-05-28 RX ORDER — LORATADINE 10 MG/1
10 TABLET ORAL NIGHTLY
Qty: 90 TABLET | Refills: 1 | Status: SHIPPED | OUTPATIENT
Start: 2024-05-28

## 2024-07-05 ENCOUNTER — TELEPHONE (OUTPATIENT)
Dept: CARDIOLOGY | Facility: CLINIC | Age: 44
End: 2024-07-05
Payer: COMMERCIAL

## 2024-07-05 DIAGNOSIS — I50.20 HEART FAILURE WITH REDUCED EJECTION FRACTION: Primary | ICD-10-CM

## 2024-07-05 RX ORDER — VALSARTAN 80 MG/1
80 TABLET ORAL DAILY
COMMUNITY
End: 2024-07-05 | Stop reason: SDUPTHER

## 2024-07-05 RX ORDER — VALSARTAN 80 MG/1
80 TABLET ORAL DAILY
Qty: 30 TABLET | Refills: 11 | Status: SHIPPED | OUTPATIENT
Start: 2024-07-05

## 2024-07-05 NOTE — TELEPHONE ENCOUNTER
Patient called stating she received a denial of coverage for her ENTRESTO and would like to know what else does provider want to do moving forward?

## 2024-07-05 NOTE — TELEPHONE ENCOUNTER
Pt advised and verbalized understanding.       John Ortiz FNP Jagneaux, Misty, LPN  Caller: Unspecified (Today, 10:32 AM)   Please provide patient information to apply for patient assistance program in efforts to obtain Entresto. Will place patient on valsartan 80 mg temporarily, until she can hopefully obtain her Entresto.  Please advise patient to monitor her BP/HR and bring BP log to upcoming clinic visit later this month.    Thank you!  John Ortiz [facility]          Previous Messages       ----- Message -----  From: Manasa Miller LPN  Sent: 7/5/2024  10:41 AM CDT  To: RANDI Levine      ----- Message -----  From: Khadijah Rosas MA  Sent: 7/5/2024  10:37 AM CDT  To: [facility] Clinical Support Staff   Patient Calls  (Newest Message First)  View All Conversations on this Encounter  John Ortiz FNP  You20 minutes ago (11:22 AM)     [place]   Please provide patient information to apply for patient assistance program in efforts to obtain Entresto. Will place patient on valsartan 80 mg temporarily, until she can hopefully obtain her Entresto.  Please advise patient to monitor her BP/HR and bring BP log to upcoming clinic visit later this month.    Thank you!  John Ortiz [facility]     You routed conversation to John Ortiz FNP1 hour ago (10:41 AM)     Khadijah Rosas MA routed conversation to [facility] Clinical Support Staff1 hour ago (10:37 AM)     Khadijah Rosas MA1 hour ago (10:37 AM)     SW  Patient called stating she received a denial of coverage for her ENTRESTO and would like to know what else does provider want to do moving forward?

## 2024-07-08 DIAGNOSIS — I50.20 HEART FAILURE WITH REDUCED EJECTION FRACTION: ICD-10-CM

## 2024-07-08 DIAGNOSIS — I10 PRIMARY HYPERTENSION: ICD-10-CM

## 2024-07-08 DIAGNOSIS — R00.2 PALPITATIONS: ICD-10-CM

## 2024-07-08 RX ORDER — METOPROLOL SUCCINATE 50 MG/1
50 TABLET, EXTENDED RELEASE ORAL DAILY
Qty: 90 TABLET | Refills: 3 | Status: SHIPPED | OUTPATIENT
Start: 2024-07-08 | End: 2025-07-08

## 2024-07-31 ENCOUNTER — OFFICE VISIT (OUTPATIENT)
Dept: INTERNAL MEDICINE | Facility: CLINIC | Age: 44
End: 2024-07-31
Payer: COMMERCIAL

## 2024-07-31 ENCOUNTER — TELEPHONE (OUTPATIENT)
Dept: INTERNAL MEDICINE | Facility: CLINIC | Age: 44
End: 2024-07-31
Payer: COMMERCIAL

## 2024-07-31 VITALS
WEIGHT: 162.63 LBS | HEART RATE: 86 BPM | SYSTOLIC BLOOD PRESSURE: 126 MMHG | HEIGHT: 62 IN | DIASTOLIC BLOOD PRESSURE: 83 MMHG | BODY MASS INDEX: 29.93 KG/M2 | RESPIRATION RATE: 18 BRPM | TEMPERATURE: 98 F

## 2024-07-31 DIAGNOSIS — R00.2 PALPITATIONS: ICD-10-CM

## 2024-07-31 DIAGNOSIS — Z12.31 VISIT FOR SCREENING MAMMOGRAM: ICD-10-CM

## 2024-07-31 DIAGNOSIS — D50.9 IRON DEFICIENCY ANEMIA, UNSPECIFIED: ICD-10-CM

## 2024-07-31 DIAGNOSIS — Z00.00 WELLNESS EXAMINATION: Primary | ICD-10-CM

## 2024-07-31 DIAGNOSIS — D50.0 IRON DEFICIENCY ANEMIA DUE TO CHRONIC BLOOD LOSS: ICD-10-CM

## 2024-07-31 DIAGNOSIS — I50.20 HEART FAILURE WITH REDUCED EJECTION FRACTION: ICD-10-CM

## 2024-07-31 DIAGNOSIS — I10 PRIMARY HYPERTENSION: ICD-10-CM

## 2024-07-31 PROCEDURE — 1160F RVW MEDS BY RX/DR IN RCRD: CPT | Mod: CPTII,,, | Performed by: NURSE PRACTITIONER

## 2024-07-31 PROCEDURE — 99213 OFFICE O/P EST LOW 20 MIN: CPT | Mod: S$PBB,25,, | Performed by: NURSE PRACTITIONER

## 2024-07-31 PROCEDURE — 99396 PREV VISIT EST AGE 40-64: CPT | Mod: S$PBB,,, | Performed by: NURSE PRACTITIONER

## 2024-07-31 PROCEDURE — 3074F SYST BP LT 130 MM HG: CPT | Mod: CPTII,,, | Performed by: NURSE PRACTITIONER

## 2024-07-31 PROCEDURE — 3079F DIAST BP 80-89 MM HG: CPT | Mod: CPTII,,, | Performed by: NURSE PRACTITIONER

## 2024-07-31 PROCEDURE — 99214 OFFICE O/P EST MOD 30 MIN: CPT | Mod: PBBFAC | Performed by: NURSE PRACTITIONER

## 2024-07-31 PROCEDURE — 4010F ACE/ARB THERAPY RXD/TAKEN: CPT | Mod: CPTII,,, | Performed by: NURSE PRACTITIONER

## 2024-07-31 PROCEDURE — 1159F MED LIST DOCD IN RCRD: CPT | Mod: CPTII,,, | Performed by: NURSE PRACTITIONER

## 2024-07-31 PROCEDURE — 3008F BODY MASS INDEX DOCD: CPT | Mod: CPTII,,, | Performed by: NURSE PRACTITIONER

## 2024-07-31 RX ORDER — FERROUS SULFATE 325(65) MG
325 TABLET ORAL DAILY
Qty: 90 TABLET | Refills: 3 | Status: SHIPPED | OUTPATIENT
Start: 2024-07-31

## 2024-07-31 NOTE — ASSESSMENT & PLAN NOTE
BP Readings from Last 3 Encounters:   07/31/24 126/83   01/24/24 137/81   06/19/23 136/88     Follow low sodium diet, < 2 gm/day (avoid high salty foods such as processed meats/ sausage/[place]/ sandwich meat, chips, pickles, cheese, crackers and soft drinks/ electrolyte replacement drinks).  Avoid tobacco/ alcohol use  Educated on health benefits of at least 5 days/ week of 30 minutes moderate intensity exercise (brisk walking) and 2 or more days/ week of muscle strength activities  Daily ASA 81 mg for CV prevention  Continue current medication regimen

## 2024-07-31 NOTE — PROGRESS NOTES
Zeynep L Luis, NP   [facility]  [address] [id number]      PATIENT NAME: Osiris Guillen  : 1980  DATE: 24  MRN: [id number]        History of Present Illness / Problem Focused Workflow     Osiris Guillen presents to the clinic with Follow-up and wellness     HPI:  43-year-old female for annual visit.  Past medical history includes hypertension, heart failure, palpitations, anemia.  Overall feeling well.  She continues to follow [facility] cardiology clinic.  She had change in insurance which is why she had to reschedule appointment.  She also states she is no longer able to afford Entresto due to insurance change and is currently on valsartan instead.  Due for mammogram.  She has an appointment with [facility] gynecology clinic February next year.  She denies any fevers, chills, headaches, dizziness, sore throat, chest pain, shortness of breath, cough, abdominal pain, nausea, vomiting, diarrhea, hematochezia, hematuria, lower extremity swelling/pain/numbness.    /83.  She has not completed labs due to insurance change not accepted here and will complete at external facility.  She needs refills on iron pills.    Other providers:  [facility] cardiology   [facility] gynecology    Review of Systems     Review of Systems   Constitutional: Negative.    HENT: Negative.     Eyes: Negative.    Respiratory: Negative.     Cardiovascular: Negative.    Gastrointestinal: Negative.    Endocrine: Negative.    Genitourinary: Negative.    Musculoskeletal: Negative.    Skin: Negative.    Allergic/Immunologic: Negative.    Neurological: Negative.    Hematological: Negative.    Psychiatric/Behavioral: Negative.         Medical / Social / Family History     -------------------------------------    Abnormal Pap smear of cervix    Anemia    Hypertension        Past Surgical History:   Procedure Laterality Date     SECTION  2011    TUBAL LIGATION          Social History     Socioeconomic History    Marital status: Single   Tobacco Use    Smoking status: Never    Smokeless tobacco: Never   Substance and Sexual Activity    Alcohol use: Not Currently     Comment: socially     Drug use: Never    Sexual activity: Yes     Partners: Male     Birth control/protection: See Surgical Hx     Social Determinants of Health     Financial Resource Strain: Low Risk  (7/29/2024)    Overall Financial Resource Strain (CARDIA)     Difficulty of Paying Living Expenses: Not hard at all   Food Insecurity: Food Insecurity Present (7/29/2024)    Hunger Vital Sign     Worried About Running Out of Food in the Last Year: Never true     Ran Out of Food in the Last Year: Sometimes true   Physical Activity: Sufficiently Active (7/29/2024)    Exercise Vital Sign     Days of Exercise per Week: 5 days     Minutes of Exercise per Session: 150+ min   Stress: No Stress Concern Present (7/29/2024)    [employer] [place] [employer] - Occupational Stress Questionnaire     Feeling of Stress : Only a little   Housing Stability: High Risk (7/29/2024)    Housing Stability Vital Sign     Unable to Pay for Housing in the Last Year: Yes        Family History   Problem Relation Name Age of Onset    Thyroid cancer Mother Razia Guillen         thymus    Diabetes Mellitus Mother Razia Guillen     Heart attack Father      Diabetes Mellitus Father      Diabetes Mellitus Sister          Medications and Allergies     Medications  Current Outpatient Medications   Medication Instructions    ferrous sulfate (FEOSOL) 325 mg, Oral, Daily    fluticasone propionate (FLONASE) 50 mcg, Each Nostril, Daily    loratadine (CLARITIN) 10 mg, Oral, Nightly    metoprolol succinate (TOPROL-XL) 50 mg, Oral, Daily    valsartan (DIOVAN) 80 mg, Oral, Daily       Allergies  Review of patient's allergies indicates:  No Known Allergies    Physical Examination     Visit Vitals  /83 (BP Location: Left arm, Patient Position:  "Sitting, BP Method: Medium (Automatic))   Pulse 86   Temp 97.9 °F (36.6 °C) (Oral)   Resp 18   Ht 5' 2.01" (1.575 m)   Wt 73.8 kg (162 lb 9.6 oz)   LMP 07/31/2024 (Exact Date)   BMI 29.73 kg/m²       Physical Exam  Vitals and nursing note reviewed.   Constitutional:       General: She is not in acute distress.     Appearance: Normal appearance. She is not ill-appearing, toxic-appearing or diaphoretic.   HENT:      Head: Normocephalic.      Right Ear: Tympanic membrane, ear canal and external ear normal.      Left Ear: Tympanic membrane, ear canal and external ear normal.      Nose: Nose normal.      Mouth/Throat:      Mouth: Mucous membranes are moist.   Eyes:      Extraocular Movements: Extraocular movements intact.      Conjunctiva/sclera: Conjunctivae normal.      Pupils: Pupils are equal, round, and reactive to light.   Neck:      Thyroid: No thyroid mass, thyromegaly or thyroid tenderness.      Vascular: No carotid bruit.   Cardiovascular:      Rate and Rhythm: Normal rate and regular rhythm.      Pulses: Normal pulses.           Dorsalis pedis pulses are 2+ on the right side and 2+ on the left side.        Posterior tibial pulses are 2+ on the right side and 2+ on the left side.   Pulmonary:      Effort: Pulmonary effort is normal. No respiratory distress.      Breath sounds: Normal breath sounds. No stridor. No wheezing, rhonchi or rales.   Abdominal:      General: Bowel sounds are normal. There is no distension.      Palpations: Abdomen is soft. There is no mass.      Tenderness: There is no abdominal tenderness. There is no guarding or rebound.      Hernia: No hernia is present.   Musculoskeletal:         General: Normal range of motion.      Cervical back: Neck supple.      Right lower leg: No edema.      Left lower leg: No edema.   Lymphadenopathy:      Cervical: No cervical adenopathy.   Skin:     General: Skin is warm and dry.      Capillary Refill: Capillary refill takes less than 2 seconds. " "  Neurological:      Mental Status: She is alert and oriented to person, place, and time. Mental status is at baseline.      Motor: No weakness.      Coordination: Coordination normal.      Gait: Gait normal.   Psychiatric:         Mood and Affect: Mood normal.         Behavior: Behavior normal.         Thought Content: Thought content normal.         Judgment: Judgment normal.           Results     Lab Results   Component Value Date    WBC 4.3 (L) 04/24/2023    RBC 4.68 04/24/2023    HGB 12.5 04/24/2023    HCT 39.6 04/24/2023    MCV 84.6 04/24/2023    MCH 26.7 (L) 04/24/2023    MCHC 31.6 (L) 04/24/2023    RDW 13.4 04/24/2023     04/24/2023    MPV 11.4 (H) 04/24/2023     Sodium   Date Value Ref Range Status   04/24/2023 140 136 - 145 mmol/L Final     Potassium   Date Value Ref Range Status   04/24/2023 4.2 3.5 - 5.1 mmol/L Final     Chloride   Date Value Ref Range Status   04/24/2023 109 (H) 98 - 107 mmol/L Final     CO2   Date Value Ref Range Status   04/24/2023 24 22 - 29 mmol/L Final     Blood Urea Nitrogen   Date Value Ref Range Status   04/24/2023 11.4 7.0 - 18.7 mg/dL Final     Creatinine   Date Value Ref Range Status   04/24/2023 0.86 0.55 - 1.02 mg/dL Final     Calcium   Date Value Ref Range Status   04/24/2023 9.0 8.4 - 10.2 mg/dL Final     Albumin   Date Value Ref Range Status   04/24/2023 4.0 3.5 - 5.0 g/dL Final     Bilirubin Total   Date Value Ref Range Status   04/24/2023 0.2 <=1.5 mg/dL Final     ALP   Date Value Ref Range Status   04/24/2023 46 40 - 150 unit/L Final     AST   Date Value Ref Range Status   04/24/2023 16 5 - 34 unit/L Final     ALT   Date Value Ref Range Status   04/24/2023 14 0 - 55 unit/L Final     Estimated GFR-Non    Date Value Ref Range Status   04/25/2022 88 >=90      Lab Results   Component Value Date    CHOL 163 04/24/2023     Lab Results   Component Value Date    HDL 35 04/24/2023     No results found for: "LDLCALC"  Lab Results   Component Value Date    " "TRIG 81 04/24/2023     No results found for: "CHOLHDL"  Lab Results   Component Value Date    TSH 1.823 04/24/2023     Lab Results   Component Value Date    PHUR 6 07/06/2019    SPECGRAV 1.025 07/06/2019    KETONESU Negative 07/06/2019    NITRITE Negative 07/06/2019    LEUKOCYTESUR Negative 07/06/2019     Lab Results   Component Value Date    HGBA1C 5.1 04/24/2023    HGBA1C 5.1 04/25/2022    HGBA1C 5.3 01/26/2021     No results found for: "MICROALBUR", "[id number]"   No results found for this or any previous visit.         Assessment       ICD-10-CM ICD-9-CM   1. Wellness examination  Z00.00 V70.0   2. Visit for screening mammogram  Z12.31 V76.12   3. Primary hypertension  I10 401.9   4. Palpitations  R00.2 785.1   5. Heart failure with reduced ejection fraction  I50.20 428.20   6. Iron deficiency anemia, unspecified  D50.9 280.9   7. Iron deficiency anemia due to chronic blood loss  D50.0 280.0       Plan       Problem List Items Addressed This Visit          Cardiac/Vascular    Heart failure with reduced ejection fraction    Overview     ECHO 10.11.21  Ejection fraction of approximately 50 to 55%, trace MR, and mild TR  Est with [facility]  Currently on Metoprolol and Valsartan         Current Assessment & Plan     Patient was unable to afford Entresto with insurance change but will look in PAP and changing insurance plan         Relevant Orders    CBC Auto Differential    Comprehensive Metabolic Panel    Lipid Panel    Microalbumin/Creatinine Ratio, Urine    Hemoglobin A1C    Palpitations    Current Assessment & Plan     Asymptomatic. On Metoprolol 50 mg daily         Primary hypertension    Overview     Current medication on Valsartan 80 mg (when Entresto was dc due to unable to afford) and Metoprolol 50 mg         Current Assessment & Plan     BP Readings from Last 3 Encounters:   07/31/24 126/83   01/24/24 137/81   06/19/23 136/88     Follow low sodium diet, < 2 gm/day (avoid high salty foods such as " processed meats/ sausage/montana/ sandwich meat, chips, pickles, cheese, crackers and soft drinks/ electrolyte replacement drinks).  Avoid tobacco/ alcohol use  Educated on health benefits of at least 5 days/ week of 30 minutes moderate intensity exercise (brisk walking) and 2 or more days/ week of muscle strength activities  Daily ASA 81 mg for CV prevention  Continue current medication regimen           Relevant Orders    CBC Auto Differential    Comprehensive Metabolic Panel    Lipid Panel    Microalbumin/Creatinine Ratio, Urine    Hemoglobin A1C       Oncology    Iron deficiency anemia due to chronic blood loss    Current Assessment & Plan     Asymptomatic. On oral ferrous sulfate, Rx refilled. Labs ordered for f/u          Other Visit Diagnoses       Wellness examination    -  Primary  Avoid tobacco/ alcohol/ drugs  Regular exercise as tolerated at least 5 days/ week of 30 minutes moderate intensity exercise (brisk walking) and 2 or more days/ week of muscle strength activities (as tolerated).  Eat well balanced diet of fresh fruits/ vegetables, whole grains, lean meats and limit high carbohydrate foods.   Healthy lifestyle habits.  Regular eye/ dental exams as recommended    Screenings:   PAP 6/19/23  MMG 6/19/23  CRC n/a     Fasting wellness labs ordered and printed for patient today to complete at external facility and records faxed over for review    Vaccines as recommended      Relevant Orders    CBC Auto Differential    Comprehensive Metabolic Panel    Lipid Panel    Microalbumin/Creatinine Ratio, Urine    Hemoglobin A1C    Visit for screening mammogram        Relevant Orders    Mammo Digital Screening Bilat    Iron deficiency anemia, unspecified        Relevant Medications    ferrous sulfate (FEOSOL) 325 mg (65 mg iron) Tab tablet            Future Appointments   Date Time Provider Department Center   8/23/2024 11:15 AM John Ortiz FNP [facility] CARD [facility]   2/18/2025  7:30 AM Mary Steele,  ANP [facility] GYN [place] [facility]   8/13/2025  7:20 AM [facility]ynep Smith NP [facility]        Follow up in about 54 weeks (around 8/13/2025) for wellness with fasting labs before visit.    Signature:  Zeynep Smith NP  [facility]  [address] [id number]    Date of encounter: 7/31/24

## 2024-07-31 NOTE — ASSESSMENT & PLAN NOTE
Patient was unable to afford Entresto with insurance change but will look in PAP and changing insurance plan

## 2024-09-25 ENCOUNTER — OFFICE VISIT (OUTPATIENT)
Dept: CARDIOLOGY | Facility: CLINIC | Age: 44
End: 2024-09-25

## 2024-09-25 VITALS
BODY MASS INDEX: 30.36 KG/M2 | DIASTOLIC BLOOD PRESSURE: 77 MMHG | RESPIRATION RATE: 18 BRPM | HEART RATE: 74 BPM | TEMPERATURE: 98 F | SYSTOLIC BLOOD PRESSURE: 131 MMHG | HEIGHT: 62 IN | OXYGEN SATURATION: 100 % | WEIGHT: 165 LBS

## 2024-09-25 DIAGNOSIS — I10 PRIMARY HYPERTENSION: ICD-10-CM

## 2024-09-25 DIAGNOSIS — I38 VHD (VALVULAR HEART DISEASE): ICD-10-CM

## 2024-09-25 DIAGNOSIS — I42.8 NICM (NONISCHEMIC CARDIOMYOPATHY): Primary | ICD-10-CM

## 2024-09-25 PROBLEM — R00.2 PALPITATIONS: Status: RESOLVED | Noted: 2022-08-01 | Resolved: 2024-09-25

## 2024-09-25 PROCEDURE — 99214 OFFICE O/P EST MOD 30 MIN: CPT | Mod: PBBFAC | Performed by: NURSE PRACTITIONER

## 2024-09-25 PROCEDURE — 99214 OFFICE O/P EST MOD 30 MIN: CPT | Mod: S$PBB,,, | Performed by: NURSE PRACTITIONER

## 2024-09-25 NOTE — PATIENT INSTRUCTIONS
ECHO. Will notify of results   Follow up in Cardiology Clinic in 6 months or sooner pending results   Follow-up with PCP as directed

## 2024-09-25 NOTE — PROGRESS NOTES
CHIEF COMPLAINT:   Chief Complaint   Patient presents with    Follow-up     Denies any cardiac problems                   Review of patient's allergies indicates:  No Known Allergies                                       HPI:  Osiris Guillen 44 y.o. female with a past medical history of NICMO (recovered- EF -50-55% per ECHO Oct. 2021), HTN and palpitations who presents to cardiology clinic for routine 6 month follow up and ongoing care. Latest Echocardiogram obtained on 10.11.21 revealed mild TR and an ejection fraction of approximately 50 to 55% which is improved from previous EF of 40% per ECHO in 2021.  Recent ischemic evaluation includes a Left Heart Catheterization on 21 due to an abnormal nuclear stress test that revealed no coronary stenosis.    Patient presents to clinic today denying any cardiovascular complaints of chest pain, shortness of breath, palpitations, orthopnea, PND, peripheral edema, claudication, lightheadedness, dizziness, near-syncope or syncope.  She is able to perform her regular heavy housework and job duties working as a [employer] , which requires ambulation throughout the day, without experiencing any angina or equivalent symptoms.  The patient states that she has been doing well overall.      CARDIAC TESTING:  ECHO 10.11.21  Ejection fraction of approximately 50 to 55%, trace MR, and mild TR    48 Hour Holter Monitor 2021   Sinus rhythm with episodes of sinus tachycardia with an average heart rate of 81 bpm. Heart rates greater than 120 bpm were noted 2% of the time. There were no episodes of bradycardia noted. She also noted to have occasional PVCs and PACs.       Patient Active Problem List   Diagnosis    Heart failure with reduced ejection fraction    Primary hypertension    Palpitations    Chronic allergic rhinitis    Iron deficiency anemia due to chronic blood loss     Past Surgical History:   Procedure Laterality Date     SECTION  2011    TUBAL  LIGATION       Social History     Socioeconomic History    Marital status: Single   Tobacco Use    Smoking status: Never    Smokeless tobacco: Never   Substance and Sexual Activity    Alcohol use: Not Currently     Comment: socially     Drug use: Never    Sexual activity: Yes     Partners: Male     Birth control/protection: See Surgical Hx     Social Determinants of Health     Financial Resource Strain: Low Risk  (7/29/2024)    Overall Financial Resource Strain (CARDIA)     Difficulty of Paying Living Expenses: Not hard at all   Food Insecurity: Food Insecurity Present (7/29/2024)    Hunger Vital Sign     Worried About Running Out of Food in the Last Year: Never true     Ran Out of Food in the Last Year: Sometimes true   Physical Activity: Sufficiently Active (7/29/2024)    Exercise Vital Sign     Days of Exercise per Week: 5 days     Minutes of Exercise per Session: 150+ min   Stress: No Stress Concern Present (7/29/2024)    [employer] [place] [employer] - Occupational Stress Questionnaire     Feeling of Stress : Only a little   Housing Stability: High Risk (7/29/2024)    Housing Stability Vital Sign     Unable to Pay for Housing in the Last Year: Yes        Family History   Problem Relation Name Age of Onset    Thyroid cancer Mother Razia Guillen         thymus    Diabetes Mellitus Mother Razia Guillen     Heart attack Father      Diabetes Mellitus Father      Diabetes Mellitus Sister           Current Outpatient Medications:     ferrous sulfate (FEOSOL) 325 mg (65 mg iron) Tab tablet, Take 1 tablet (325 mg total) by mouth once daily., Disp: 90 tablet, Rfl: 3    loratadine (CLARITIN) 10 mg tablet, TAKE 1 TABLET BY MOUTH EVERY EVENING, Disp: 90 tablet, Rfl: 1    metoprolol succinate (TOPROL-XL) 50 MG 24 hr tablet, Take 1 tablet (50 mg total) by mouth once daily., Disp: 90 tablet, Rfl: 3    valsartan (DIOVAN) 80 MG tablet, Take 1 tablet (80 mg total) by mouth once daily., Disp: 30 tablet, Rfl: 11     "fluticasone propionate (FLONASE) 50 mcg/actuation nasal spray, 1 spray (50 mcg total) by Each Nostril route once daily. (Patient not taking: Reported on 9/25/2024), Disp: 16 g, Rfl: 6     ROS:                                                                                                                                                                             Review of Systems   Constitutional: Negative.    Respiratory: Negative.     Cardiovascular:  Positive for palpitations.   Gastrointestinal: Negative.    Musculoskeletal: Negative.    Skin: Negative.    Neurological: Negative.    Psychiatric/Behavioral: Negative.          Blood pressure 131/77, pulse 74, temperature 97.5 °F (36.4 °C), temperature source Oral, resp. rate 18, height 5' 2" (1.575 m), weight 74.8 kg (165 lb), SpO2 100%.   PE:  Physical Exam  Constitutional:       Appearance: Normal appearance.   HENT:      Head: Normocephalic and atraumatic.   Eyes:      Extraocular Movements: Extraocular movements intact.      Pupils: Pupils are equal, round, and reactive to light.   Cardiovascular:      Rate and Rhythm: Normal rate and regular rhythm.   Pulmonary:      Effort: Pulmonary effort is normal.      Breath sounds: Normal breath sounds.   Abdominal:      Palpations: Abdomen is soft.   Musculoskeletal:         General: Normal range of motion.      Cervical back: Normal range of motion. No tenderness.   Skin:     General: Skin is warm and dry.   Neurological:      General: No focal deficit present.      Mental Status: She is alert and oriented to person, place, and time.   Psychiatric:         Mood and Affect: Mood normal.         Behavior: Behavior normal.       ASSESSMENT/PLAN:  NICMO (recovered) NYHA Class I  - EF 50-55% per Echo Oct. 2021 recovered from previous EF 40% per ECHO March 2021  - [facility]  - no coronary stenosis (7.26.21)  - Lexiscan stress test- Anterior and apical defect, medium size, mild to moderate intensity with reversibility " (5.25.21)  - Denies SOB or  VEGA  - Euvolemic upon exam, warm and dry  - Continue GDMT: Metoprolol Succinate 50 mg and Diovan 80 mg.  Entresto was previously changed unfortunately, due to cost  - Counseled patient on low-sodium diet, 2 gm max daily and s/s of volume overload  - Patient has labs ordered.  However she is in the process of obtaining financial assistance.  The patient instructed to complete labs once able    HTN  - BP at goal today   - Continue Metoprolol Succinate 50 mg and Diovan 80 mg   - Counseled on low-sodium diet and exercise as tolerated     VHD  - EF -50-55%, mild TR per ECHO Oct. 2021  - Obtain Echocardiogram for continued monitoring of valves        ECHO. Will notify of results   Follow up in Cardiology Clinic in 6 months or sooner pending results   Follow-up with PCP as directed

## 2025-01-31 ENCOUNTER — HOSPITAL ENCOUNTER (OUTPATIENT)
Dept: RADIOLOGY | Facility: HOSPITAL | Age: 45
Discharge: HOME | End: 2025-01-31
Attending: NURSE PRACTITIONER

## 2025-01-31 DIAGNOSIS — Z12.31 VISIT FOR SCREENING MAMMOGRAM: ICD-10-CM

## 2025-01-31 PROCEDURE — 77063 BREAST TOMOSYNTHESIS BI: CPT | Mod: 26,,, | Performed by: RADIOLOGY

## 2025-01-31 PROCEDURE — 77063 BREAST TOMOSYNTHESIS BI: CPT | Mod: TC

## 2025-01-31 PROCEDURE — 77067 SCR MAMMO BI INCL CAD: CPT | Mod: 26,,, | Performed by: RADIOLOGY

## 2025-02-03 ENCOUNTER — TELEPHONE (OUTPATIENT)
Dept: INTERNAL MEDICINE | Facility: CLINIC | Age: 45
End: 2025-02-03

## 2025-02-03 NOTE — TELEPHONE ENCOUNTER
Received request for lab orders from [facility].   Please contact pt to inquire regarding.   She has [facility] and labs need to be completed at outside facility approved with her insurance. Please confirm if she did this around the time of her last visit 7/2024. I do not see results.     Thank you

## 2025-02-18 ENCOUNTER — OFFICE VISIT (OUTPATIENT)
Dept: GYNECOLOGY | Facility: CLINIC | Age: 45
End: 2025-02-18

## 2025-02-18 VITALS
SYSTOLIC BLOOD PRESSURE: 121 MMHG | WEIGHT: 144 LBS | BODY MASS INDEX: 26.5 KG/M2 | HEIGHT: 62 IN | DIASTOLIC BLOOD PRESSURE: 76 MMHG | OXYGEN SATURATION: 100 % | TEMPERATURE: 98 F | RESPIRATION RATE: 18 BRPM | HEART RATE: 87 BPM

## 2025-02-18 DIAGNOSIS — N93.9 ABNORMAL UTERINE BLEEDING (AUB): ICD-10-CM

## 2025-02-18 DIAGNOSIS — Z12.11 COLON CANCER SCREENING: ICD-10-CM

## 2025-02-18 DIAGNOSIS — Z01.419 ENCOUNTER FOR ANNUAL ROUTINE GYNECOLOGICAL EXAMINATION: Primary | ICD-10-CM

## 2025-02-18 LAB
B-HCG UR QL: NEGATIVE
CTP QC/QA: YES

## 2025-02-18 PROCEDURE — 81025 URINE PREGNANCY TEST: CPT | Mod: PBBFAC | Performed by: NURSE PRACTITIONER

## 2025-02-18 PROCEDURE — 99214 OFFICE O/P EST MOD 30 MIN: CPT | Mod: PBBFAC | Performed by: NURSE PRACTITIONER

## 2025-02-18 RX ORDER — MEDROXYPROGESTERONE ACETATE 10 MG/1
10 TABLET ORAL DAILY
Qty: 30 TABLET | Refills: 12 | Status: SHIPPED | OUTPATIENT
Start: 2025-02-18

## 2025-02-18 NOTE — PROGRESS NOTES
"  [facility] -  Gynecology / Women's Health Clinic      Subjective:       Patient ID: Osiris Guillen is a 44 y.o. female.    Chief Complaint:  Well Woman (No complaints )    History of Present Illness  The patient  here for annual exam. Her LMP was 24. Period last 5 days and changes pads 2-3x/day. Hx of uterine fibroid, takes daily iron, feels cycles are heavy. Admits history of abnormal pap with colpo in 2017. Pap was ASCUS and HPV positive, ECC benign. Pap in -NIL and HPV neg. MG-25-BIRADS 2. Pt had Lt breast bx which showed fibroadenamatoid changes. Denies urinary complaints. Denies pelvic pain or discharge. Pt reports chlamydia in the past and declines testing. HPV vaccine completed. Contraception consist of TL. Denies tobacco use. Denies fly hx of breast, ovarian, uterine or colon cancer.     GYN & OB History  Patient's last menstrual period was 2025.   Date of Last Pap: 2023    Review of patient's allergies indicates:  No Known Allergies  Past Medical History:   Diagnosis Date    Abnormal Pap smear of cervix     Anemia     Hypertension      OB History    Para Term  AB Living   4 3   1 3   SAB IAB Ectopic Multiple Live Births   1    3      # Outcome Date GA Lbr Stas/2nd Weight Sex Type Anes PTL Lv   4 SAB            3 Para      CS-Unspec   SPEEDY   2 Para      CS-Unspec   SPEEDY   1 Para      CS-Unspec   SPEEDY        Review of Systems  Review of Systems    Negative except for pertinent findings for positives per HPI     Objective:    Physical Exam    /76 (BP Location: Right arm, Patient Position: Sitting)   Pulse 87   Temp 98.3 °F (36.8 °C) (Oral)   Resp 18   Ht 5' 2" (1.575 m)   Wt 65.3 kg (144 lb)   LMP 2025   SpO2 100%   BMI 26.34 kg/m²   GENERAL: Well-developed female in no acute distress.  SKIN: Normal to inspection, warm and intact.  BREASTS: No masses, lumps, discharge, tenderness.  VULVA: General appearance normal; external " genitalia with no lesions or erythema.  VAGINA: Mucosa normal, pink, no abnormal discharge or lesions.  CERVIX: Grossly normal, pink, no erythema or abnormal discharge.  BIMANUAL EXAM: reveals a 10 week-sized uterus. The uterus is non tender. Noah adnexa reveal no evidence of masses, tenderness.  PSYCHIATRIC: Patient is oriented to person, place, and time. Mood and affect are normal.    Assessment:         ICD-10-CM ICD-9-CM   1. Encounter for annual routine gynecological examination  Z01.419 V72.31   2. Abnormal uterine bleeding (AUB)  N93.9 626.9   3. Colon cancer screening  Z12.11 V76.51     Plan:   Osiris was seen today for well woman.    Diagnoses and all orders for this visit:    Encounter for annual routine gynecological examination    Abnormal uterine bleeding (AUB)  -     US Pelvis Comp with Transvag NON-OB (xpd; Future  -     POCT urine pregnancy  -     medroxyPROGESTERone (PROVERA) 10 MG tablet; Take 1 tablet (10 mg total) by mouth once daily. Take daily as prescribed.    Colon cancer screening  -     Cologuard Screening (Multitarget Stool DNA); Future  -     Cologuard Screening (Multitarget Stool DNA)    Pelvic today, pap utd per ACOG  Cologuard  Hx of fibroids, Pelvic uls ordered  Discussed options for medical management of AUB with pt. Depo, Provera and Mirena IUD. Depo-pt informed that will likely control bleeding either with amenorrhea or lighten cycles, potential weight gain. Mirena IUD also likely best option for medical management. Limited systemic absorption, possible amenorrhea or lighter cycles and coverage for 5 years. Provera-pt informed that will likely control bleeding either with amenorrhea or lighten cycles. Pt will proceed with Provera.  Follow up in about 1 year (around 2/18/2026) for annual exam.

## 2025-06-19 DIAGNOSIS — I50.20 HEART FAILURE WITH REDUCED EJECTION FRACTION: ICD-10-CM

## 2025-06-19 DIAGNOSIS — I10 PRIMARY HYPERTENSION: ICD-10-CM

## 2025-06-19 DIAGNOSIS — R00.2 PALPITATIONS: ICD-10-CM

## 2025-06-20 RX ORDER — METOPROLOL SUCCINATE 50 MG/1
50 TABLET, EXTENDED RELEASE ORAL DAILY
Qty: 90 TABLET | Refills: 3 | Status: SHIPPED | OUTPATIENT
Start: 2025-06-20 | End: 2026-06-20

## 2025-06-20 RX ORDER — VALSARTAN 80 MG/1
80 TABLET ORAL DAILY
Qty: 90 TABLET | Refills: 3 | Status: SHIPPED | OUTPATIENT
Start: 2025-06-20 | End: 2026-06-20

## 2025-08-13 ENCOUNTER — OFFICE VISIT (OUTPATIENT)
Dept: INTERNAL MEDICINE | Facility: CLINIC | Age: 45
End: 2025-08-13

## 2025-08-13 ENCOUNTER — LAB VISIT (OUTPATIENT)
Dept: LAB | Facility: HOSPITAL | Age: 45
End: 2025-08-13
Attending: NURSE PRACTITIONER

## 2025-08-13 VITALS
OXYGEN SATURATION: 100 % | DIASTOLIC BLOOD PRESSURE: 80 MMHG | BODY MASS INDEX: 30.69 KG/M2 | WEIGHT: 166.81 LBS | TEMPERATURE: 98 F | HEIGHT: 62 IN | HEART RATE: 69 BPM | SYSTOLIC BLOOD PRESSURE: 136 MMHG

## 2025-08-13 DIAGNOSIS — D50.9 IRON DEFICIENCY ANEMIA, UNSPECIFIED: ICD-10-CM

## 2025-08-13 DIAGNOSIS — D50.0 IRON DEFICIENCY ANEMIA DUE TO CHRONIC BLOOD LOSS: ICD-10-CM

## 2025-08-13 DIAGNOSIS — I42.8 NICM (NONISCHEMIC CARDIOMYOPATHY): ICD-10-CM

## 2025-08-13 DIAGNOSIS — I10 PRIMARY HYPERTENSION: ICD-10-CM

## 2025-08-13 DIAGNOSIS — Z00.00 WELLNESS EXAMINATION: Primary | ICD-10-CM

## 2025-08-13 DIAGNOSIS — J30.9 CHRONIC ALLERGIC RHINITIS: ICD-10-CM

## 2025-08-13 DIAGNOSIS — J30.9 CHRONIC ALLERGIC RHINITIS: Primary | ICD-10-CM

## 2025-08-13 DIAGNOSIS — I50.20 HEART FAILURE WITH REDUCED EJECTION FRACTION: ICD-10-CM

## 2025-08-13 DIAGNOSIS — M79.10 MUSCULAR PAIN: ICD-10-CM

## 2025-08-13 DIAGNOSIS — R94.4 HIGH URINE CREATINE/CREATININE RATIO: ICD-10-CM

## 2025-08-13 LAB
ALBUMIN SERPL-MCNC: 3.6 G/DL (ref 3.5–5)
ALBUMIN/CREAT UR: 16.1 MG/GM CR (ref 0–30)
ALBUMIN/GLOB SERPL: 0.9 RATIO (ref 1.1–2)
ALP SERPL-CCNC: 37 UNIT/L (ref 40–150)
ALT SERPL-CCNC: 11 UNIT/L (ref 0–55)
ANION GAP SERPL CALC-SCNC: 6 MEQ/L
AST SERPL-CCNC: 16 UNIT/L (ref 11–45)
BASOPHILS # BLD AUTO: 0.02 X10(3)/MCL
BASOPHILS NFR BLD AUTO: 0.4 %
BILIRUB SERPL-MCNC: 0.4 MG/DL
BUN SERPL-MCNC: 8.7 MG/DL (ref 7–18.7)
CALCIUM SERPL-MCNC: 8.4 MG/DL (ref 8.4–10.2)
CHLORIDE SERPL-SCNC: 110 MMOL/L (ref 98–107)
CHOLEST SERPL-MCNC: 154 MG/DL
CHOLEST/HDLC SERPL: 4 {RATIO} (ref 0–5)
CO2 SERPL-SCNC: 24 MMOL/L (ref 22–29)
CREAT SERPL-MCNC: 0.81 MG/DL (ref 0.55–1.02)
CREAT UR-MCNC: 356.4 MG/DL (ref 45–106)
CREAT/UREA NIT SERPL: 11
EOSINOPHIL # BLD AUTO: 0.13 X10(3)/MCL (ref 0–0.9)
EOSINOPHIL NFR BLD AUTO: 2.6 %
ERYTHROCYTE [DISTWIDTH] IN BLOOD BY AUTOMATED COUNT: 14.2 % (ref 11.5–17)
EST. AVERAGE GLUCOSE BLD GHB EST-MCNC: 102.5 MG/DL
GFR SERPLBLD CREATININE-BSD FMLA CKD-EPI: >60 ML/MIN/1.73/M2
GLOBULIN SER-MCNC: 3.8 GM/DL (ref 2.4–3.5)
GLUCOSE SERPL-MCNC: 98 MG/DL (ref 74–100)
HBA1C MFR BLD: 5.2 %
HCT VFR BLD AUTO: 38.1 % (ref 37–47)
HDLC SERPL-MCNC: 36 MG/DL (ref 35–60)
HGB BLD-MCNC: 12 G/DL (ref 12–16)
IMM GRANULOCYTES # BLD AUTO: 0.01 X10(3)/MCL (ref 0–0.04)
IMM GRANULOCYTES NFR BLD AUTO: 0.2 %
LDLC SERPL CALC-MCNC: 105 MG/DL (ref 50–140)
LYMPHOCYTES # BLD AUTO: 1.24 X10(3)/MCL (ref 0.6–4.6)
LYMPHOCYTES NFR BLD AUTO: 24.8 %
MCH RBC QN AUTO: 27.4 PG (ref 27–31)
MCHC RBC AUTO-ENTMCNC: 31.5 G/DL (ref 33–36)
MCV RBC AUTO: 87 FL (ref 80–94)
MICROALBUMIN UR-MCNC: 57.5 UG/ML
MONOCYTES # BLD AUTO: 0.47 X10(3)/MCL (ref 0.1–1.3)
MONOCYTES NFR BLD AUTO: 9.4 %
NEUTROPHILS # BLD AUTO: 3.14 X10(3)/MCL (ref 2.1–9.2)
NEUTROPHILS NFR BLD AUTO: 62.6 %
NRBC BLD AUTO-RTO: 0 %
PLATELET # BLD AUTO: 232 X10(3)/MCL (ref 130–400)
PMV BLD AUTO: 10.3 FL (ref 7.4–10.4)
POTASSIUM SERPL-SCNC: 3.8 MMOL/L (ref 3.5–5.1)
PROT SERPL-MCNC: 7.4 GM/DL (ref 6.4–8.3)
RBC # BLD AUTO: 4.38 X10(6)/MCL (ref 4.2–5.4)
SODIUM SERPL-SCNC: 140 MMOL/L (ref 136–145)
TRIGL SERPL-MCNC: 66 MG/DL (ref 37–140)
VLDLC SERPL CALC-MCNC: 13 MG/DL
WBC # BLD AUTO: 5.01 X10(3)/MCL (ref 4.5–11.5)

## 2025-08-13 PROCEDURE — 80061 LIPID PANEL: CPT

## 2025-08-13 PROCEDURE — 99213 OFFICE O/P EST LOW 20 MIN: CPT | Mod: PBBFAC | Performed by: NURSE PRACTITIONER

## 2025-08-13 PROCEDURE — 85025 COMPLETE CBC W/AUTO DIFF WBC: CPT

## 2025-08-13 PROCEDURE — 82570 ASSAY OF URINE CREATININE: CPT

## 2025-08-13 PROCEDURE — 83036 HEMOGLOBIN GLYCOSYLATED A1C: CPT

## 2025-08-13 PROCEDURE — 36415 COLL VENOUS BLD VENIPUNCTURE: CPT

## 2025-08-13 PROCEDURE — 80053 COMPREHEN METABOLIC PANEL: CPT

## 2025-08-13 RX ORDER — IBUPROFEN 800 MG/1
800 TABLET, FILM COATED ORAL
COMMUNITY
Start: 2025-05-05

## 2025-08-13 RX ORDER — LORATADINE 10 MG/1
10 TABLET ORAL NIGHTLY
Qty: 90 TABLET | Refills: 3 | Status: SHIPPED | OUTPATIENT
Start: 2025-08-13

## 2025-08-13 RX ORDER — ACETAMINOPHEN AND CODEINE PHOSPHATE 300; 30 MG/1; MG/1
1 TABLET ORAL
COMMUNITY
Start: 2025-05-05

## 2025-08-13 RX ORDER — FERROUS SULFATE 325(65) MG
325 TABLET ORAL DAILY
Qty: 90 TABLET | Refills: 3 | Status: SHIPPED | OUTPATIENT
Start: 2025-08-13